# Patient Record
Sex: MALE | Race: WHITE | NOT HISPANIC OR LATINO | Employment: OTHER | URBAN - METROPOLITAN AREA
[De-identification: names, ages, dates, MRNs, and addresses within clinical notes are randomized per-mention and may not be internally consistent; named-entity substitution may affect disease eponyms.]

---

## 2017-02-16 ENCOUNTER — OFFICE VISIT (OUTPATIENT)
Dept: FAMILY MEDICINE | Facility: CLINIC | Age: 52
End: 2017-02-16
Payer: MEDICARE

## 2017-02-16 ENCOUNTER — HOSPITAL ENCOUNTER (OUTPATIENT)
Dept: RADIOLOGY | Facility: HOSPITAL | Age: 52
Discharge: HOME | End: 2017-02-16
Attending: FAMILY MEDICINE
Payer: MEDICARE

## 2017-02-16 VITALS
BODY MASS INDEX: 27.28 KG/M2 | SYSTOLIC BLOOD PRESSURE: 102 MMHG | HEIGHT: 66 IN | DIASTOLIC BLOOD PRESSURE: 72 MMHG | WEIGHT: 169.75 LBS | HEART RATE: 76 BPM

## 2017-02-16 DIAGNOSIS — M25.552 PAIN OF LEFT HIP JOINT: Primary | ICD-10-CM

## 2017-02-16 DIAGNOSIS — G89.29 OTHER CHRONIC PAIN: ICD-10-CM

## 2017-02-16 DIAGNOSIS — M25.552 PAIN OF LEFT HIP JOINT: ICD-10-CM

## 2017-02-16 DIAGNOSIS — M48.02 CERVICAL STENOSIS OF SPINAL CANAL: ICD-10-CM

## 2017-02-16 DIAGNOSIS — F17.200 SMOKER: ICD-10-CM

## 2017-02-16 PROCEDURE — 99214 OFFICE O/P EST MOD 30 MIN: CPT | Mod: S$GLB,,, | Performed by: FAMILY MEDICINE

## 2017-02-16 PROCEDURE — 73502 X-RAY EXAM HIP UNI 2-3 VIEWS: CPT | Mod: TC,PO,LT

## 2017-02-16 PROCEDURE — 99999 PR PBB SHADOW E&M-EST. PATIENT-LVL III: CPT | Mod: PBBFAC,,, | Performed by: FAMILY MEDICINE

## 2017-02-16 PROCEDURE — 73502 X-RAY EXAM HIP UNI 2-3 VIEWS: CPT | Mod: 26,LT,, | Performed by: RADIOLOGY

## 2017-02-16 NOTE — MR AVS SNAPSHOT
[facility]  [address]  [place] [address]  Phone: [phone]  Fax: [phone]                  Wild Galeano   2017 2:40 PM   Office Visit    Description:  Male : 1965   Provider:  Kenny Estrada MD   Department:  [facility]           Reason for Visit     left hip pain           Diagnoses this Visit        Comments    Pain of left hip joint    -  Primary     Other chronic pain                To Do List           Future Appointments        Provider Department Dept Phone    2017 3:45 PM NS XR2 [facility] [phone]    2017 1:30 PM Artur Dennis MD [employer] [phone]      Goals (5 Years of Data)     None      [facility]     [facility] Nurse Care [facility] -  Assistance  Registered nurses in the [facility] provide clinical advisement, health education, appointment booking, and other advisory services.  Call for this free service at [phone].             Medications           Message regarding Medications     Verify the changes and/or additions to your medication regime listed below are the same as discussed with your clinician today.  If any of these changes or additions are incorrect, please notify your healthcare provider.        STOP taking these medications     predniSONE (DELTASONE) 20 MG tablet            Verify that the below list of medications is an accurate representation of the medications you are currently taking.  If none reported, the list may be blank. If incorrect, please contact your healthcare provider. Carry this list with you in case of emergency.           Current Medications     carisoprodol (SOMA) 350 MG tablet Take 1 tablet by mouth 3 (three) times daily as needed.    chlorhexidine (HIBICLENS) 4 % external liquid Apply topically daily as needed. Wash affected areas daily until lesions resolve.    lidocaine-prilocaine (EMLA) cream      "morphine (MS CONTIN) 100 MG 12 hr tablet TAKE ONE TABLET BY MOUTH THREE TIMES DAILY IF NEEDED FOR PAIN    oxycodone (ROXICODONE) 30 MG Tab Take 1 tablet by mouth every 4 (four) hours as needed.           Clinical Reference Information           Your Vitals Were     BP Pulse Height Weight BMI    102/72 76 5' 6" (1.676 m) 77 kg (169 lb 12.1 oz) 27.4 kg/m2      Blood Pressure          Most Recent Value    BP  102/72      Allergies as of 2/16/2017     No Known Allergies      Immunizations Administered on Date of Encounter - 2/16/2017     None      Orders Placed During Today's Visit      Normal Orders This Visit    Ambulatory referral to Orthopedics     Future Labs/Procedures Expected by Expires    X-Ray Hips Bilateral 2 View Incl AP Pelvis  2/16/2017 2/16/2018      Smoking Cessation     If you would like to quit smoking:   You may be eligible for free services if you are a [place] resident and started smoking cigarettes before September 1, 1988.  Call the Smoking Cessation Trust ([facility]) toll free at (998) 524-4945 or (624) 524-7239.   Call 7-823-QUIT-NOW if you do not meet the above criteria.            Language Assistance Services     ATTENTION: Language assistance services are available, free of charge. Please call [phone].      ATENCIÓN: Si habla español, tiene a nelson disposición servicios gratuitos de asistencia lingüística. Llame al [phone].     HENRI Ý: N?u b?n nói Ti?ng Vi?t, có các d?ch v? h? tr? ngôn ng? mi?n phí dành cho b?n. G?i s? [phone].         [facility] complies with applicable Federal civil rights laws and does not discriminate on the basis of race, color, national origin, age, disability, or sex.        "

## 2017-02-20 ENCOUNTER — OFFICE VISIT (OUTPATIENT)
Dept: ORTHOPEDICS | Facility: CLINIC | Age: 52
End: 2017-02-20
Payer: MEDICARE

## 2017-02-20 VITALS — BODY MASS INDEX: 27.16 KG/M2 | HEIGHT: 66 IN | WEIGHT: 169 LBS

## 2017-02-20 DIAGNOSIS — M16.9 HIP ARTHROSIS: Primary | ICD-10-CM

## 2017-02-20 DIAGNOSIS — M48.061 SPINAL STENOSIS OF LUMBAR REGION: ICD-10-CM

## 2017-02-20 DIAGNOSIS — M25.552 HIP PAIN, LEFT: ICD-10-CM

## 2017-02-20 PROCEDURE — 99999 PR PBB SHADOW E&M-EST. PATIENT-LVL II: CPT | Mod: PBBFAC,,, | Performed by: ORTHOPAEDIC SURGERY

## 2017-02-20 PROCEDURE — 99213 OFFICE O/P EST LOW 20 MIN: CPT | Mod: 57,S$GLB,, | Performed by: ORTHOPAEDIC SURGERY

## 2017-02-20 NOTE — PROGRESS NOTES
Wild Galeano is a 51-year-old referred by Dr. Estrada with debilitating   left hip pain that has gotten progressively worse over the past 10 years.  He is   still has a longstanding nonoperative course.  He uses a cane.  He takes oral   medication, has been to therapy, had not significant relief.  He has end-stage   arthritic changes at left hip.    Exam today shows painful motion about the hip.  Skin is intact.  Compartments   are soft.  No signs of infection.  No instability.  Limited motion about the   hip, which is painful.    X-rays show severe arthritic changes of the left hip.    ASSESSMENT:  Left hip arthrosis.    PLAN:  We will schedule him for hip arthroplasty.  The patient is aware of the   risks of surgery and still wants to proceed.  We look forward to seeing him at   the time of surgery.      DOMI/RONEN  dd: 02/20/2017 13:58:24 (CST)  td: 02/20/2017 18:14:12 (CST)  Doc ID   #[id number]  Job ID #[id number]    CC:     Further History  Aching pain  Worse with activity  Relieved with rest  No other associated symptoms  No other radiation    Further Exam  Alert and oriented  Pleasant  Contralateral limb has appropriate range of motion for age and condition  Contralateral limb has appropriate strength for age and condition  Contralateral limb has appropriate stability  for age and condition  No adenopathy  Pulses are appropriate for current condition  Skin is intact        Chief Complaint    Chief Complaint   Patient presents with    Left Hip - Pain       HPI  Wild Galeano is a 51 y.o.  male who presents with       Past Medical History  History reviewed. No pertinent past medical history.    Past Surgical History  History reviewed. No pertinent past surgical history.    Medications  Current Outpatient Prescriptions   Medication Sig    carisoprodol (SOMA) 350 MG tablet Take 1 tablet by mouth 3 (three) times daily as needed.    chlorhexidine (HIBICLENS) 4 % external liquid Apply topically daily as  needed. Wash affected areas daily until lesions resolve.    lidocaine-prilocaine (EMLA) cream     morphine (MS CONTIN) 100 MG 12 hr tablet TAKE ONE TABLET BY MOUTH THREE TIMES DAILY IF NEEDED FOR PAIN    oxycodone (ROXICODONE) 30 MG Tab Take 1 tablet by mouth every 4 (four) hours as needed.     No current facility-administered medications for this visit.        Allergies  Review of patient's allergies indicates:  No Known Allergies    Family History  History reviewed. No pertinent family history.    Social History  Social History     Social History    Marital status:      Spouse name: N/A    Number of children: N/A    Years of education: N/A     Occupational History    Not on file.     Social History Main Topics    Smoking status: Current Every Day Smoker    Smokeless tobacco: Never Used    Alcohol use Not on file    Drug use: Not on file    Sexual activity: Not on file     Other Topics Concern    Not on file     Social History Narrative               Review of Systems     Constitutional: Negative    HENT: Negative  Eyes: Negative  Respiratory: Negative  Cardiovascular: Negative  Musculoskeletal: HPI  Skin: Negative  Neurological: Negative  Hematological: Negative  Endocrine: Negative                 Physical Exam    There were no vitals filed for this visit.  Body mass index is 27.28 kg/(m^2).  Physical Examination:     General appearance -  well appearing, and in no distress  Mental status - awake  Neck - supple  Chest -  symmetric air entry  Heart - normal rate   Abdomen - soft      Assessment     1. Hip pain, left    2. Spinal stenosis of lumbar region    3. Hip arthrosis          Plan

## 2017-02-20 NOTE — LETTER
February 20, 2017      Kenny Estrada MD  [address] [place] [address]           [facility]  [address] [place] [address]  Phone: [phone]          Patient: Wild Galeano   MR Number: [id number]   YOB: 1965   Date of Visit: 2/20/2017       Dear Dr. Kenny Estrada:    Thank you for referring Wild Galeano to me for evaluation. Attached you will find relevant portions of my assessment and plan of care.    If you have questions, please do not hesitate to call me. I look forward to following Wild Galeano along with you.    Sincerely,    Artur Dennis MD    Enclosure  CC:  No Recipients    If you would like to receive this communication electronically, please contact [email or web address] or [phone] to request more information on "[employer] Link access.    For providers and/or their staff who would like to refer a patient to [facility], please contact us through our one-stop-shop provider referral line, [facility], at [phone].    If you feel you have received this communication in error or would no longer like to receive these types of communications, please e-mail [email or web address]

## 2017-02-20 NOTE — PROGRESS NOTES
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  He   has a history of chronic pain already, but now the left hip is beginning to   fail on him.  He has a history of cervical myelopathy with cervical stenosis and   chronic weakness of the lower extremities, chronic pain and smoking.    PHYSICAL EXAMINATION:  VITAL SIGNS:  Normal.  GENERAL:  He walks with a distinct limp.  He has had pain in the left hip.  BACK:  No cervical, thoracic or lumbar spine tenderness today.  EXTREMITIES:  Good pulses in the upper or lower extremities.  No peripheral   edema.  CHEST:  Clear.    ASSESSMENT:  Pain in the left hip, chronic pain, cervical stenosis and smoking.    PLAN:  We addressed each issue, put in a consult to Orthopedics.  We discussed   smoking cessation, pain control.  We reviewed previous x-ray results.      ARCHIE  dd: 02/19/2017 19:41:32 (CST)  td: 02/19/2017 21:03:48 (CST)  Doc ID   #[id number]  Job ID #[id number]    CC:

## 2017-02-21 ENCOUNTER — TELEPHONE (OUTPATIENT)
Dept: FAMILY MEDICINE | Facility: CLINIC | Age: 52
End: 2017-02-21

## 2017-02-21 NOTE — TELEPHONE ENCOUNTER
----- Message from Francoise Jackson LPN sent at 2/21/2017  3:46 PM CST -----  Pt is scheduled for left total hip arthroplasty on 3/7/17 with Dr. Dennis, are you able to clear by chart or fit in for a preop clearance?    Thanks,  Francoise

## 2017-02-21 NOTE — TELEPHONE ENCOUNTER
Dr Estrada/s first appt was 3/7/17 the day surgery is scheduled. I have sent a message to see if we can double book him

## 2017-02-22 DIAGNOSIS — Z96.642 STATUS POST TOTAL REPLACEMENT OF LEFT HIP: Primary | ICD-10-CM

## 2017-02-23 ENCOUNTER — ANTI-COAG VISIT (OUTPATIENT)
Dept: CARDIOLOGY | Facility: CLINIC | Age: 52
End: 2017-02-23

## 2017-02-23 DIAGNOSIS — Z79.01 LONG TERM (CURRENT) USE OF ANTICOAGULANTS: ICD-10-CM

## 2017-02-23 RX ORDER — WARFARIN SODIUM 5 MG/1
5 TABLET ORAL DAILY
Qty: 30 TABLET | Refills: 1 | Status: SHIPPED | OUTPATIENT
Start: 2017-02-23 | End: 2017-03-31 | Stop reason: SDUPTHER

## 2017-02-23 NOTE — TELEPHONE ENCOUNTER
----- Message from Jannette Seo sent at 2/22/2017  4:13 PM CST -----  Contact: self  Patient wants to speak with a nurse regarding scheduling an appointment before 03/22.Please call back at [phone] (home)

## 2017-02-27 ENCOUNTER — OFFICE VISIT (OUTPATIENT)
Dept: FAMILY MEDICINE | Facility: CLINIC | Age: 52
End: 2017-02-27
Payer: MEDICARE

## 2017-02-27 ENCOUNTER — LAB VISIT (OUTPATIENT)
Dept: LAB | Facility: HOSPITAL | Age: 52
End: 2017-02-27
Attending: FAMILY MEDICINE
Payer: MEDICARE

## 2017-02-27 VITALS
HEIGHT: 66 IN | WEIGHT: 166.44 LBS | OXYGEN SATURATION: 98 % | SYSTOLIC BLOOD PRESSURE: 104 MMHG | DIASTOLIC BLOOD PRESSURE: 76 MMHG | BODY MASS INDEX: 26.75 KG/M2 | HEART RATE: 79 BPM

## 2017-02-27 DIAGNOSIS — R79.1 ABNORMAL COAGULATION PROFILE: ICD-10-CM

## 2017-02-27 DIAGNOSIS — F17.200 SMOKER: ICD-10-CM

## 2017-02-27 DIAGNOSIS — M50.90 CERVICAL DISC DISEASE: ICD-10-CM

## 2017-02-27 DIAGNOSIS — Z01.818 PREOPERATIVE CLEARANCE: Primary | ICD-10-CM

## 2017-02-27 DIAGNOSIS — Z01.818 PREOPERATIVE CLEARANCE: ICD-10-CM

## 2017-02-27 DIAGNOSIS — G89.4 CHRONIC PAIN SYNDROME: ICD-10-CM

## 2017-02-27 DIAGNOSIS — M25.552 PAIN OF LEFT HIP JOINT: ICD-10-CM

## 2017-02-27 LAB
ALBUMIN SERPL BCP-MCNC: 3.8 G/DL
ALP SERPL-CCNC: 131 U/L
ALT SERPL W/O P-5'-P-CCNC: 19 U/L
ANION GAP SERPL CALC-SCNC: 8 MMOL/L
AST SERPL-CCNC: 22 U/L
BASOPHILS # BLD AUTO: 0.02 K/UL
BASOPHILS NFR BLD: 0.2 %
BILIRUB SERPL-MCNC: 0.6 MG/DL
BUN SERPL-MCNC: 6 MG/DL
CALCIUM SERPL-MCNC: 9.5 MG/DL
CHLORIDE SERPL-SCNC: 104 MMOL/L
CO2 SERPL-SCNC: 29 MMOL/L
CREAT SERPL-MCNC: 0.8 MG/DL
DIFFERENTIAL METHOD: ABNORMAL
EOSINOPHIL # BLD AUTO: 0.1 K/UL
EOSINOPHIL NFR BLD: 1.2 %
ERYTHROCYTE [DISTWIDTH] IN BLOOD BY AUTOMATED COUNT: 12.8 %
EST. GFR  (AFRICAN AMERICAN): >60 ML/MIN/1.73 M^2
EST. GFR  (NON AFRICAN AMERICAN): >60 ML/MIN/1.73 M^2
GLUCOSE SERPL-MCNC: 159 MG/DL
HCT VFR BLD AUTO: 45.8 %
HGB BLD-MCNC: 15.3 G/DL
INR PPP: 1.1
LYMPHOCYTES # BLD AUTO: 2.3 K/UL
LYMPHOCYTES NFR BLD: 25.7 %
MCH RBC QN AUTO: 31.4 PG
MCHC RBC AUTO-ENTMCNC: 33.4 %
MCV RBC AUTO: 94 FL
MONOCYTES # BLD AUTO: 0.5 K/UL
MONOCYTES NFR BLD: 5.5 %
NEUTROPHILS # BLD AUTO: 6.1 K/UL
NEUTROPHILS NFR BLD: 67.3 %
PLATELET # BLD AUTO: 267 K/UL
PMV BLD AUTO: 10.3 FL
POTASSIUM SERPL-SCNC: 3.9 MMOL/L
PROT SERPL-MCNC: 7.7 G/DL
PROTHROMBIN TIME: 11.1 SEC
RBC # BLD AUTO: 4.88 M/UL
SODIUM SERPL-SCNC: 141 MMOL/L
WBC # BLD AUTO: 9.11 K/UL

## 2017-02-27 PROCEDURE — 99999 PR PBB SHADOW E&M-EST. PATIENT-LVL III: CPT | Mod: PBBFAC,,, | Performed by: FAMILY MEDICINE

## 2017-02-27 PROCEDURE — 99499 UNLISTED E&M SERVICE: CPT | Mod: S$GLB,,, | Performed by: FAMILY MEDICINE

## 2017-02-27 PROCEDURE — 99215 OFFICE O/P EST HI 40 MIN: CPT | Mod: S$GLB,,, | Performed by: FAMILY MEDICINE

## 2017-02-27 PROCEDURE — 1160F RVW MEDS BY RX/DR IN RCRD: CPT | Mod: S$GLB,,, | Performed by: FAMILY MEDICINE

## 2017-02-27 PROCEDURE — 85025 COMPLETE CBC W/AUTO DIFF WBC: CPT

## 2017-02-27 PROCEDURE — 36415 COLL VENOUS BLD VENIPUNCTURE: CPT | Mod: PO

## 2017-02-27 PROCEDURE — 85610 PROTHROMBIN TIME: CPT | Mod: PO

## 2017-02-27 PROCEDURE — 80053 COMPREHEN METABOLIC PANEL: CPT

## 2017-02-27 RX ORDER — CARISOPRODOL 350 MG/1
350 TABLET ORAL 3 TIMES DAILY PRN
Qty: 90 TABLET | Refills: 0 | Status: SHIPPED | OUTPATIENT
Start: 2017-02-27 | End: 2017-05-01

## 2017-02-27 RX ORDER — MORPHINE SULFATE 100 MG/1
TABLET, FILM COATED, EXTENDED RELEASE ORAL
Qty: 90 TABLET | Refills: 0 | Status: SHIPPED | OUTPATIENT
Start: 2017-02-27 | End: 2017-11-28 | Stop reason: DRUGHIGH

## 2017-02-27 RX ORDER — OXYCODONE HYDROCHLORIDE 30 MG/1
30 TABLET ORAL EVERY 4 HOURS PRN
Qty: 60 TABLET | Refills: 0 | Status: SHIPPED | OUTPATIENT
Start: 2017-02-27 | End: 2017-11-28 | Stop reason: ALTCHOICE

## 2017-02-27 NOTE — MR AVS SNAPSHOT
[facility]  [address]  [facility] [address]  Phone: [phone]  Fax: [phone]                  Wild Galeano   2017 1:00 PM   Office Visit    Description:  Male : 1965   Provider:  Kenny Estrada MD   Department:  [facility]           Reason for Visit     Pre-op Exam           Diagnoses this Visit        Comments    Preoperative clearance    -  Primary     Pain of left hip joint         Smoker         Chronic pain syndrome         Abnormal coagulation profile         Status post total replacement of left hip                To Do List           Future Appointments        Provider Department Dept Phone    2017 2:10 PM LAB, [place] [facility] [phone]    3/22/2017 10:45 AM Artur Dennis MD [facility] [phone]      Goals (5 Years of Data)     None       These Medications        Disp Refills Start End    carisoprodol (SOMA) 350 MG tablet 90 tablet 0 2017     Take 1 tablet (350 mg total) by mouth 3 (three) times daily as needed. - Oral    Pharmacy: [facility]/pharmacy #[id number] - [place] - [address] AT [place] Ph #: [phone]       morphine (MS CONTIN) 100 MG 12 hr tablet 90 tablet 0 2017     TAKE ONE TABLET BY MOUTH THREE TIMES DAILY IF NEEDED FOR PAIN    Pharmacy: [facility]/pharmacy #[id number] - [place] - [address] AT [place] Ph #: [phone]       oxycodone (ROXICODONE) 30 MG Tab 60 tablet 0 2017     Take 1 tablet (30 mg total) by mouth every 4 (four) hours as needed. - Oral    Pharmacy: [facility]/pharmacy #[id number] - [place], [place] - [address] AT [place] Ph #: [phone]         [facility] On Call     [facility] On Call Nurse Care Line -  Assistance  Registered nurses in the [facility] provide clinical advisement, health education, appointment booking, and other advisory services.  Call for this free service at [phone].              Medications           Message regarding Medications     Verify the changes and/or additions to your medication regime listed below are the same as discussed with your clinician today.  If any of these changes or additions are incorrect, please notify your healthcare provider.        CHANGE how you are taking these medications     Start Taking Instead of    carisoprodol (SOMA) 350 MG tablet carisoprodol (SOMA) 350 MG tablet    Dosage:  Take 1 tablet (350 mg total) by mouth 3 (three) times daily as needed. Dosage:  Take 1 tablet by mouth 3 (three) times daily as needed.    Reason for Change:  Reorder     oxycodone (ROXICODONE) 30 MG Tab oxycodone (ROXICODONE) 30 MG Tab    Dosage:  Take 1 tablet (30 mg total) by mouth every 4 (four) hours as needed. Dosage:  Take 1 tablet by mouth every 4 (four) hours as needed.    Reason for Change:  Reorder       STOP taking these medications     lidocaine-prilocaine (EMLA) cream            Verify that the below list of medications is an accurate representation of the medications you are currently taking.  If none reported, the list may be blank. If incorrect, please contact your healthcare provider. Carry this list with you in case of emergency.           Current Medications     carisoprodol (SOMA) 350 MG tablet Take 1 tablet (350 mg total) by mouth 3 (three) times daily as needed.    chlorhexidine (HIBICLENS) 4 % external liquid Apply topically daily as needed. Wash affected areas daily until lesions resolve.    morphine (MS CONTIN) 100 MG 12 hr tablet TAKE ONE TABLET BY MOUTH THREE TIMES DAILY IF NEEDED FOR PAIN    oxycodone (ROXICODONE) 30 MG Tab Take 1 tablet (30 mg total) by mouth every 4 (four) hours as needed.    warfarin (COUMADIN) 5 MG tablet Take 1 tablet (5 mg total) by mouth Daily. Start night before surgery 3/6/17           Clinical Reference Information           Your Vitals Were     BP                   104/76           Blood Pressure          Most Recent Value     BP  104/76      Allergies as of 2/27/2017     No Known Allergies      Immunizations Administered on Date of Encounter - 2/27/2017     None      Orders Placed During Today's Visit      Normal Orders This Visit    IN OFFICE EKG 12-LEAD (to [place])     Future Labs/Procedures Expected by Expires    CBC auto differential  2/27/2017 5/28/2017    Protime-INR  2/27/2017 5/28/2017    Comprehensive metabolic panel  8/26/2017 4/28/2018      Smoking Cessation     If you would like to quit smoking:   You may be eligible for free services if you are a [place] resident and started smoking cigarettes before September 1, 1988.  Call the Smoking Cessation Trust ([employer]) toll free at ([phone] [phone] or (719) 629-3823.   Call 0-810-QUIT-NOW if you do not meet the above criteria.            Language Assistance Services     ATTENTION: Language assistance services are available, free of charge. Please call [phone].      ATENCIÓN: Si habla español, tiene a nelson disposición servicios gratuitos de asistencia lingüística. Llame al [phone].     CHÚ Ý: N?u b?n nói Ti?ng Vi?t, có các d?ch v? h? tr? ngôn ng? mi?n phí dành cho b?n. G?i s? [phone].         [facility] complies with applicable Federal civil rights laws and does not discriminate on the basis of race, color, national origin, age, disability, or sex.

## 2017-03-05 ENCOUNTER — TELEPHONE (OUTPATIENT)
Dept: FAMILY MEDICINE | Facility: CLINIC | Age: 52
End: 2017-03-05

## 2017-03-05 NOTE — PROGRESS NOTES
A pleasant male here today.  He is going to have left knee surgery through Dr. Dennis's office.  He is here for preop clearance.  He is a smoker.  He does   have chronic pain.  He is an ex- vet.  He also has cervical disk disease   with myelopathy.  No recent chest pain, nausea or vomiting.  No diarrhea.  No   acute bowel or bladder changes recently.  No recent injuries  PHYSICAL EXAMINATION:  VITAL SIGNS:  Normal.  GENERAL:  He walks with a single-legged cane.  He is in pain.  He is not   homicidal or suicidal.  MUSCULOSKELETAL:  The hip has pain with range of motion.  EXTREMITIES:  He does have myelopathy and myopathy in the lower extremities   bilaterally.    ASSESSMENT:  Preop clearance, pain left hip, smoking, chronic pain and cervical   disk disease.    PLAN:  We will order CBC, CMP, EKG, PT and INR.  I do not see any problems for   clearing him for surgery.      FRANK/TN  dd: 03/05/2017 07:54:49 (CST)  td: 03/05/2017 12:18:35 (CST)  Doc ID   #[id number]  Job ID #[id number]    CC:

## 2017-03-06 ENCOUNTER — OFFICE VISIT (OUTPATIENT)
Dept: ORTHOPEDICS | Facility: CLINIC | Age: 52
End: 2017-03-06
Payer: MEDICARE

## 2017-03-06 ENCOUNTER — HOSPITAL ENCOUNTER (OUTPATIENT)
Dept: RADIOLOGY | Facility: HOSPITAL | Age: 52
Discharge: HOME | End: 2017-03-06
Attending: ORTHOPAEDIC SURGERY
Payer: MEDICARE

## 2017-03-06 VITALS — BODY MASS INDEX: 26.68 KG/M2 | HEIGHT: 66 IN | WEIGHT: 166 LBS

## 2017-03-06 DIAGNOSIS — M25.552 HIP PAIN, LEFT: Primary | ICD-10-CM

## 2017-03-06 DIAGNOSIS — M25.552 PAIN OF LEFT HIP JOINT: ICD-10-CM

## 2017-03-06 DIAGNOSIS — F19.20 DEPENDENCY ON PAIN MEDICATION: ICD-10-CM

## 2017-03-06 DIAGNOSIS — M25.552 PAIN OF LEFT HIP JOINT: Primary | ICD-10-CM

## 2017-03-06 DIAGNOSIS — M25.562 LEFT KNEE PAIN, UNSPECIFIED CHRONICITY: ICD-10-CM

## 2017-03-06 PROCEDURE — 73502 X-RAY EXAM HIP UNI 2-3 VIEWS: CPT | Mod: 26,LT,, | Performed by: RADIOLOGY

## 2017-03-06 PROCEDURE — 99999 PR PBB SHADOW E&M-EST. PATIENT-LVL II: CPT | Mod: PBBFAC,,, | Performed by: ORTHOPAEDIC SURGERY

## 2017-03-06 PROCEDURE — 99213 OFFICE O/P EST LOW 20 MIN: CPT | Mod: S$GLB,,, | Performed by: ORTHOPAEDIC SURGERY

## 2017-03-06 PROCEDURE — 73562 X-RAY EXAM OF KNEE 3: CPT | Mod: 26,LT,, | Performed by: RADIOLOGY

## 2017-03-06 PROCEDURE — 73560 X-RAY EXAM OF KNEE 1 OR 2: CPT | Mod: 26,59,RT, | Performed by: RADIOLOGY

## 2017-03-06 PROCEDURE — 1160F RVW MEDS BY RX/DR IN RCRD: CPT | Mod: S$GLB,,, | Performed by: ORTHOPAEDIC SURGERY

## 2017-03-06 RX ORDER — MUPIROCIN 20 MG/G
OINTMENT TOPICAL
Refills: 0 | COMMUNITY
Start: 2017-03-01 | End: 2017-05-01

## 2017-03-06 NOTE — PROGRESS NOTES
Wild Galeano, 51 years old, want to have his hips checked.  He had a fall   yesterday.  Want to have it checked before his hip replacement surgery, which is   scheduled for tomorrow.  X-rays are negative.    At this point, we will get him scheduled for his hip replacement surgery.  I   look forward to see him at that time.      DOMI/RONEN  dd: 03/06/2017 15:55:21 (CST)  td: 03/07/2017 01:51:44 (CST)  Doc ID   #[id number]  Job ID #[id number]    CC:

## 2017-03-10 ENCOUNTER — TELEPHONE (OUTPATIENT)
Dept: ADMINISTRATIVE | Facility: CLINIC | Age: 52
End: 2017-03-10

## 2017-03-15 NOTE — PROGRESS NOTES
Pt educated on importance of consistency when eating foods high in vitamin K and when to call the [facility]mad[facility] Clinic.Pt given clinic phone number to call with any changes/questions. Pt verbalized understanding.

## 2017-03-16 ENCOUNTER — TELEPHONE (OUTPATIENT)
Dept: ADMINISTRATIVE | Facility: CLINIC | Age: 52
End: 2017-03-16

## 2017-03-16 DIAGNOSIS — N30.00 ACUTE CYSTITIS WITHOUT HEMATURIA: Primary | ICD-10-CM

## 2017-03-17 NOTE — TELEPHONE ENCOUNTER
Dr Peña  [facility] is requesting an order for u/a cands. Pt has low grade fever and foul order to urine. Please advise if we may have order. Dr Estrada has left for vacation.

## 2017-03-20 ENCOUNTER — LAB VISIT (OUTPATIENT)
Dept: LAB | Facility: HOSPITAL | Age: 52
End: 2017-03-20
Attending: ORTHOPAEDIC SURGERY
Payer: MEDICARE

## 2017-03-20 ENCOUNTER — ANTI-COAG VISIT (OUTPATIENT)
Dept: CARDIOLOGY | Facility: CLINIC | Age: 52
End: 2017-03-20

## 2017-03-20 ENCOUNTER — TELEPHONE (OUTPATIENT)
Dept: ORTHOPEDICS | Facility: CLINIC | Age: 52
End: 2017-03-20

## 2017-03-20 DIAGNOSIS — Z79.01 LONG TERM (CURRENT) USE OF ANTICOAGULANTS: ICD-10-CM

## 2017-03-20 DIAGNOSIS — Z47.1 AFTERCARE FOLLOWING JOINT REPLACEMENT: Primary | ICD-10-CM

## 2017-03-20 LAB
INR PPP: 1.8
PROTHROMBIN TIME: 18.4 SEC

## 2017-03-20 PROCEDURE — 85610 PROTHROMBIN TIME: CPT | Mod: PO

## 2017-03-20 NOTE — TELEPHONE ENCOUNTER
----- Message from Marcia Leiva sent at 3/20/2017  3:27 PM CDT -----  Contact: wife kellen   Needs to reschedule back to 03 22 2017  Call back  [phone]

## 2017-03-20 NOTE — PROGRESS NOTES
Spoke with pt gave dosing and next inr check date pt voiced understanding. Pt wants to be seen in clinic instead of h[facility] coming out this time.

## 2017-03-22 ENCOUNTER — OFFICE VISIT (OUTPATIENT)
Dept: ORTHOPEDICS | Facility: CLINIC | Age: 52
End: 2017-03-22
Payer: MEDICARE

## 2017-03-22 VITALS — HEIGHT: 66 IN | BODY MASS INDEX: 26.68 KG/M2 | WEIGHT: 166 LBS

## 2017-03-22 DIAGNOSIS — Z96.642 STATUS POST TOTAL REPLACEMENT OF LEFT HIP: Primary | ICD-10-CM

## 2017-03-22 PROCEDURE — 99999 PR PBB SHADOW E&M-EST. PATIENT-LVL II: CPT | Mod: PBBFAC,,, | Performed by: ORTHOPAEDIC SURGERY

## 2017-03-22 PROCEDURE — 99024 POSTOP FOLLOW-UP VISIT: CPT | Mod: S$GLB,,, | Performed by: ORTHOPAEDIC SURGERY

## 2017-03-22 RX ORDER — BACLOFEN 10 MG/1
TABLET ORAL
COMMUNITY
Start: 2017-03-14 | End: 2017-11-27

## 2017-03-28 NOTE — PROGRESS NOTES
Spoke with pt, pt states he does not  Remember stating he wanted to be seen in clinic rather than h[facility] he states  he now wants h[facility] to come out and have pt inr level done spoke PERRI Case at [place] she voiced understanding order sent to [facility]

## 2017-03-29 ENCOUNTER — LAB VISIT (OUTPATIENT)
Dept: LAB | Facility: HOSPITAL | Age: 52
End: 2017-03-29
Attending: ORTHOPAEDIC SURGERY
Payer: MEDICARE

## 2017-03-29 DIAGNOSIS — Z47.1 AFTERCARE FOLLOWING JOINT REPLACEMENT: Primary | ICD-10-CM

## 2017-03-29 LAB
INR PPP: 2.4
PROTHROMBIN TIME: 23.8 SEC

## 2017-03-29 PROCEDURE — 85610 PROTHROMBIN TIME: CPT | Mod: PO

## 2017-03-30 ENCOUNTER — ANTI-COAG VISIT (OUTPATIENT)
Dept: CARDIOLOGY | Facility: CLINIC | Age: 52
End: 2017-03-30

## 2017-03-30 DIAGNOSIS — Z96.642 STATUS POST TOTAL REPLACEMENT OF LEFT HIP: ICD-10-CM

## 2017-03-30 DIAGNOSIS — Z79.01 LONG TERM (CURRENT) USE OF ANTICOAGULANTS: ICD-10-CM

## 2017-03-30 NOTE — PROGRESS NOTES
lvm C dosing pt was asked to return call to clinic to voice understanding. pts h[facility] was d/c'd pt needs appt

## 2017-03-31 RX ORDER — WARFARIN SODIUM 5 MG/1
5-7.5 TABLET ORAL DAILY
Qty: 35 TABLET | Refills: 3 | Status: SHIPPED | OUTPATIENT
Start: 2017-03-31 | End: 2017-04-19

## 2017-03-31 NOTE — PROGRESS NOTES
Spoke with pt gave dosing and next inr check date pt voiced understanding. Pt states he needs a refill on coumadin snt to [facility] in [place] on [address]

## 2017-04-06 ENCOUNTER — ANTI-COAG VISIT (OUTPATIENT)
Dept: CARDIOLOGY | Facility: CLINIC | Age: 52
End: 2017-04-06
Payer: MEDICARE

## 2017-04-06 DIAGNOSIS — Z79.01 LONG TERM (CURRENT) USE OF ANTICOAGULANTS: ICD-10-CM

## 2017-04-06 LAB — INR PPP: 2.8 (ref 2–3)

## 2017-04-06 PROCEDURE — 99211 OFF/OP EST MAY X REQ PHY/QHP: CPT | Mod: 25,S$GLB,, | Performed by: PHARMACIST

## 2017-04-06 PROCEDURE — 85610 PROTHROMBIN TIME: CPT | Mod: QW,S$GLB,, | Performed by: PHARMACIST

## 2017-04-06 NOTE — MR AVS SNAPSHOT
[facility]  [address]  [place] [address]  Phone: [phone]                  Wild CARSON Puneetalejandrajaime   2017 9:30 AM   Anti-coag visit    Description:  Male : 1965   Provider:  Fay Tan, Georgiana   Department:  [facility]           Diagnoses this Visit        Comments    Long term (current) use of anticoagulants                To Do List           Future Appointments        Provider Department Dept Phone    2017 2:15 PM Kalyani Conway PharmD [facility] [phone]    2017 11:15 AM Artur Dennis MD [facility] [phone]      Goals (5 Years of Data)     None      [facility]     [facility] Care [facility] -  Assistance  Unless otherwise directed by your provider, please contact [facility], our nurse care line that is available for  assistance.     Registered nurses in the [facility] provide: appointment scheduling, clinical advisement, health education, and other advisory services.  Call: [phone] (toll free)               Medications           Message regarding Medications     Verify the changes and/or additions to your medication regime listed below are the same as discussed with your clinician today.  If any of these changes or additions are incorrect, please notify your healthcare provider.             Verify that the below list of medications is an accurate representation of the medications you are currently taking.  If none reported, the list may be blank. If incorrect, please contact your healthcare provider. Carry this list with you in case of emergency.           Current Medications     baclofen (LIORESAL) 10 MG tablet     carisoprodol (SOMA) 350 MG tablet Take 1 tablet (350 mg total) by mouth 3 (three) times daily as needed.    chlorhexidine (HIBICLENS) 4 % external liquid Apply topically daily as needed. Wash affected areas daily until lesions resolve.    morphine (MS CONTIN)  100 MG 12 hr tablet TAKE ONE TABLET BY MOUTH THREE TIMES DAILY IF NEEDED FOR PAIN    mupirocin (BACTROBAN) 2 % ointment USE INTRANASALLY TWICE DAILY FOR 7 DAYS AS DIRECTED    oxycodone (ROXICODONE) 30 MG Tab Take 1 tablet (30 mg total) by mouth every 4 (four) hours as needed.    warfarin (COUMADIN) 5 MG tablet Take 1-1.5 tablets (5-7.5 mg total) by mouth Daily. Start night before surgery 3/6/17           Clinical Reference Information           Allergies as of 4/6/2017     No Known Allergies      Immunizations Administered on Date of Encounter - 4/6/2017     None      Orders Placed During Today's Visit      Normal Orders This Visit    POCT INR          4/6/2017  9:49 AM - Fay Tan, PharmD      Component Results     Component    INR    2.8      March 2017 Details    Sun Mon Tue Wed Thu Fri Sat        1               2               3               4                 5               6               7      5 mg         8      5 mg         9      5 mg         10      5 mg         11      5 mg           12      5 mg         13      5 mg         14      5 mg         15      5 mg         16      5 mg         17      5 mg         18      5 mg           19      5 mg         20   1.8   7.5 mg   See details      21      5 mg         22      5 mg         23      5 mg         24      5 mg         25      5 mg           26      5 mg         27      7.5 mg         28      5 mg         29   2.4   5 mg   See details      30      5 mg   See details      31      5 mg           Date Details   03/20 INR: 1.8   Coumadin Therapy: 2.0 - 3.0 for INR for all indicators except mechanical heart valves and antiphospholipid syndromes which should use 2.5 - 3.5.       03/29 INR: 2.4   Coumadin Therapy: 2.0 - 3.0 for INR for all indicators except mechanical heart valves and antiphospholipid syndromes which should use 2.5 - 3.5.       03/30 Last INR check                 April 2017 Details    Sun Mon Tue Wed Thu Fri Sat           1      5 mg            2      5 mg         3      7.5 mg         4      5 mg         5      5 mg         6   2.8   5 mg   See details      7      5 mg         8      5 mg           9      5 mg         10      5 mg         11      5 mg         12      5 mg         13      5 mg         14      5 mg         15      5 mg           16      5 mg         17      5 mg         18            19               20               21               22                 23               24               25               26               27               28               29                 30                      Date Details   04/06 This INR check   INR: 2.8       Date of next INR:  4/18/2017               How to take your warfarin dose     To take:  5 mg Take 1 of the 5 mg tablets.           Anticoagulation Summary as of 4/6/2017     Maintenance plan 5 mg (5 mg x 1) every day    Full instructions 5 mg every day    Next INR check 4/18/2017      Anticoagulation Episode Summary     Comments [facility] MAYI 3/7 **Farrukh XIONG D/C'd as of 03/30/17**      Smoking Cessation     If you would like to quit smoking:   You may be eligible for free services if you are a [place] resident and started smoking cigarettes before September 1, 1988.  Call the Smoking Cessation Trust ([facility]) toll free at [phone] or [phone].   Call 1-800-QUIT-NOW if you do not meet the above criteria.   Contact us via email: [email or web address]   View our website for more information: [email or web address]        Language Assistance Services     ATTENTION: Language assistance services are available, free of charge. Please call [phone].      ATENCIÓN: Si habla español, tiene a nelson disposición servicios gratuitos de asistencia lingüística. Llame al [phone].     CHÚ Ý: N?u b?n nói Ti?ng Vi?t, có các d?ch v? h? tr? ngôn ng? mi?n phí dành cho b?n. G?i s? [phone].         [facility] complies with applicable Federal civil rights laws and does not  discriminate on the basis of race, color, national origin, age, disability, or sex.

## 2017-04-06 NOTE — PROGRESS NOTES
INR in range, seems to be trending upward.  Pt had anxiety about getting fingerstick, in which he pulled away, cursed and jumped out of his chair and started pacing in the room.  Once he did offer hand to stick and the stick was performed, he said it was not as bad as he thought and did not hurt.  Pt has a spinal cord injury and has neuropathy in his hands.  He feels that in the future he would not respond in this manner.  I am lowering dose to avoid a continual increase, recheck in 10 days; this in hopers of not having to perform any further INR's for this pt.  D/c date is 4/25

## 2017-04-18 ENCOUNTER — ANTI-COAG VISIT (OUTPATIENT)
Dept: CARDIOLOGY | Facility: CLINIC | Age: 52
End: 2017-04-18
Payer: MEDICARE

## 2017-04-18 DIAGNOSIS — Z79.01 LONG TERM (CURRENT) USE OF ANTICOAGULANTS: ICD-10-CM

## 2017-04-18 DIAGNOSIS — Z96.642 STATUS POST TOTAL REPLACEMENT OF LEFT HIP: Primary | ICD-10-CM

## 2017-04-18 LAB — INR PPP: 1.2 (ref 2–3)

## 2017-04-18 PROCEDURE — 99211 OFF/OP EST MAY X REQ PHY/QHP: CPT | Mod: 25,S$GLB,,

## 2017-04-18 PROCEDURE — 85610 PROTHROMBIN TIME: CPT | Mod: QW,S$GLB,,

## 2017-04-18 NOTE — PROGRESS NOTES
Patient confirms dose and compliance. Denies any changes to account for low INR. He is due to discontinue warfarin today. Will stop warfarin therapy at this time.

## 2017-04-19 ENCOUNTER — HOSPITAL ENCOUNTER (OUTPATIENT)
Dept: RADIOLOGY | Facility: HOSPITAL | Age: 52
Discharge: HOME | End: 2017-04-19
Attending: ORTHOPAEDIC SURGERY
Payer: MEDICARE

## 2017-04-19 ENCOUNTER — OFFICE VISIT (OUTPATIENT)
Dept: ORTHOPEDICS | Facility: CLINIC | Age: 52
End: 2017-04-19
Payer: MEDICARE

## 2017-04-19 VITALS — BODY MASS INDEX: 26.68 KG/M2 | HEIGHT: 66 IN | WEIGHT: 166 LBS

## 2017-04-19 DIAGNOSIS — Z96.642 STATUS POST TOTAL REPLACEMENT OF LEFT HIP: ICD-10-CM

## 2017-04-19 DIAGNOSIS — Z96.642 STATUS POST TOTAL REPLACEMENT OF LEFT HIP: Primary | ICD-10-CM

## 2017-04-19 PROCEDURE — 73502 X-RAY EXAM HIP UNI 2-3 VIEWS: CPT | Mod: 26,LT,, | Performed by: RADIOLOGY

## 2017-04-19 PROCEDURE — 99024 POSTOP FOLLOW-UP VISIT: CPT | Mod: S$GLB,,, | Performed by: ORTHOPAEDIC SURGERY

## 2017-04-19 PROCEDURE — 99999 PR PBB SHADOW E&M-EST. PATIENT-LVL II: CPT | Mod: PBBFAC,,, | Performed by: ORTHOPAEDIC SURGERY

## 2017-05-01 ENCOUNTER — OFFICE VISIT (OUTPATIENT)
Dept: FAMILY MEDICINE | Facility: CLINIC | Age: 52
End: 2017-05-01
Payer: MEDICARE

## 2017-05-01 VITALS
BODY MASS INDEX: 26.79 KG/M2 | SYSTOLIC BLOOD PRESSURE: 119 MMHG | TEMPERATURE: 98 F | WEIGHT: 166.69 LBS | HEIGHT: 66 IN | DIASTOLIC BLOOD PRESSURE: 84 MMHG | HEART RATE: 97 BPM

## 2017-05-01 DIAGNOSIS — L98.9 SCALP LESION: ICD-10-CM

## 2017-05-01 DIAGNOSIS — B35.6 JOCK ITCH: ICD-10-CM

## 2017-05-01 DIAGNOSIS — R73.9 HYPERGLYCEMIA: ICD-10-CM

## 2017-05-01 DIAGNOSIS — B35.6 TINEA CRURIS: Primary | ICD-10-CM

## 2017-05-01 PROCEDURE — 1160F RVW MEDS BY RX/DR IN RCRD: CPT | Mod: S$GLB,,, | Performed by: NURSE PRACTITIONER

## 2017-05-01 PROCEDURE — 99213 OFFICE O/P EST LOW 20 MIN: CPT | Mod: S$GLB,,, | Performed by: NURSE PRACTITIONER

## 2017-05-01 PROCEDURE — 99999 PR PBB SHADOW E&M-EST. PATIENT-LVL III: CPT | Mod: PBBFAC,,, | Performed by: NURSE PRACTITIONER

## 2017-05-01 RX ORDER — MUPIROCIN 20 MG/G
OINTMENT TOPICAL 3 TIMES DAILY
Qty: 1 TUBE | Refills: 1 | Status: SHIPPED | OUTPATIENT
Start: 2017-05-01 | End: 2017-11-27

## 2017-05-01 RX ORDER — FLUCONAZOLE 100 MG/1
100 TABLET ORAL DAILY
Qty: 5 TABLET | Refills: 0 | Status: SHIPPED | OUTPATIENT
Start: 2017-05-01 | End: 2017-05-06

## 2017-05-01 RX ORDER — KETOCONAZOLE 20 MG/G
CREAM TOPICAL DAILY
Qty: 1 TUBE | Refills: 0 | Status: SHIPPED | OUTPATIENT
Start: 2017-05-01 | End: 2017-11-27

## 2017-05-01 NOTE — PATIENT INSTRUCTIONS
Bactroban (mupirocin): to scalp    Ketoconazole: to groin          Understanding Carbohydrates, Fats, and Protein  Food is a source of fuel and nourishment for your body. Its also a source of pleasure. Having diabetes doesnt mean you have to eat special foods or give up desserts. Instead, your dietitian can show you how to plan meals to suit your body. To start, learn how different foods affect blood sugar.  Carbohydrates  Carbohydrates are the main source of fuel for the body. Carbohydrates raise blood sugar. Many people think carbohydrates are only found in pasta or bread. But carbohydrates are actually in many kinds of foods:  · Sugars occur naturally in foods such as fruit, milk, honey, and molasses. Sugars can also be added to many foods, from cereals and yogurt to candy and desserts. Sugars raise blood sugar.  · Starches are found in bread, cereals, pasta, and dried beans. Theyre also found in corn, peas, potatoes, yam, acorn squash, and butternut squash. Starches also raise blood sugar.   · Fiber is found in foods such as vegetables, fruits, beans, and whole grains. Unlike other carbs, fiber isnt digested or absorbed. So it doesnt raise blood sugar. In fact, fiber can help keep blood sugar from rising too fast. It also helps keep blood cholesterol at a healthy level.  Did you know?  Even though carbohydrates raise blood sugar, its best to have some in every meal. They are an important part of a healthy diet.   Fat  Fat is an energy source that can be stored until needed. Fat does not raise blood sugar. However, it can raise blood cholesterol, increasing the risk of heart disease. Fat is also high in calories, which can cause weight gain. Not all types of fat are the same.  More Healthy:  · Monounsaturated fats are mostly found in vegetable oils, such as olive, canola, and peanut oils. They are also found in avocados and some nuts. Monounsaturated fats are healthy for your heart. Thats because they  lower LDL (unhealthy) cholesterol.  · Polyunsaturated fats are mostly found in vegetable oils, such as corn, safflower, and soybean oils. They are also found in some seeds, nuts, and fish. Polyunsaturated fats lower LDL (unhealthy) cholesterol. So, choosing them instead of saturated fats is healthy for your heart. Certain unsaturated fats can help lower triglycerides.   Less Healthy:  · Saturated fats are found in animal products, such as meat, poultry, whole milk, lard, and butter. Saturated fats raise LDL cholesterol and are not healthy for your heart.  · Hydrogenated oils and trans fats are formed when vegetable oils are processed into solid fats. They are found in many processed foods. Hydrogenated oils and trans fats raise LDL cholesterol and lower HDL (healthy) cholesterol. They are not healthy for your heart.  Protein  Protein helps the body build and repair muscle and other tissue. Protein has little or no effect on blood sugar. However, many foods that contain protein also contain saturated fat. By choosing low-fat protein sources, you can get the benefits of protein without the extra fat:  · Plant protein is found in dry beans and peas, nuts, and soy products, such as tofu and soymilk. These sources tend to be cholesterol-free and low in saturated fat.  · Animal protein is found in fish, poultry, meat, cheese, milk, and eggs. These contain cholesterol and can be high in saturated fat. Aim for lean, lower-fat choices.  Date Last Reviewed: 3/1/2016  © 7945-7104 [employer]. [address] [place] [address]. All rights reserved. This information is not intended as a substitute for professional medical care. Always follow your healthcare professional's instructions.            Jock Itch (Tinea Cruris, General)  Jock itch (tinea cruris) is a red, itchy rash in the groin caused by a fungal infection. It occurs in skin folds where it is warm and moist. It commonly starts as a small, red,  itchy patch that grows larger. The patch is usually in the shape of a round ring, 1 to 2 inches wide. It may cause the skin to flake. It may also spread to the scrotum or the skin that covers your testicles. This infection is treated with skin creams or oral medicine.  Home care  · If you were prescribed a cream, use it exactly as directed. You can buy some antifungal creams without a prescription.  · It may take a week before the fungus starts to go away. It can take about 2 to 3 weeks to completely clear. To stop the rash from coming back, keep using the medicine until the rash is all gone.  · Wash the area at least once a day with soap and water. Pat dry and apply medicine.   · Wear loose-fitting underwear to let your skin breathe. Change your underwear daily.  · Once the rash is gone, keep the area clean and dry to prevent reinfection. If recurrence is a problem, use a medicated antifungal powder daily. This is available over the counter.  Prevention  The following tips may help prevent jock itch:  · Don't share clothes, towels, or sports gear with others unless they have been washed.  · Change your underwear daily.  · Keep skin clean and dry, especially after showering or swimming.  · Lose weight.  · Do not wear tight underwear.  · Treat athlete's foot if it occurs.  Follow-up care  Follow up with your healthcare provider, or as advised. Call your provider if the rash is not starting to improve after 10 days of treatment, or if the rash continues to spread.  When to seek medical advice  Call your healthcare provider right away if any of these occur:  · Increasing pain in the rash area  · Redness that spreads around the rash  · Fluid draining from the rash  · Rash returns soon after treatment  · Fever of 100.4°F (38°C) or higher, or as directed by your provider  Date Last Reviewed: 8/1/2016  © 4509-6859 [employer]. [address] [place] [address], [place] [address]. All rights reserved. This information is  not intended as a substitute for professional medical care. Always follow your healthcare professional's instructions.

## 2017-05-01 NOTE — MR AVS SNAPSHOT
[facility]  [address] [place] [address]  Phone: [phone]  Fax: [phone]                  Wild Galeano   2017 10:30 AM   Office Visit    Description:  Male : 1965   Provider:  Petrona New NP   Department:  [facility]           Reason for Visit     Rash     Hair/Scalp Problem           Diagnoses this Visit        Comments    Tinea cruris    -  Primary     Jock itch         Hyperglycemia         Scalp lesion                To Do List           Goals (5 Years of Data)     None       These Medications        Disp Refills Start End    fluconazole (DIFLUCAN) 100 MG tablet 5 tablet 0 2017    Take 1 tablet (100 mg total) by mouth once daily. - Oral    Pharmacy: [facility] - [address] Ph #: [phone]       ketoconazole (NIZORAL) 2 % cream 1 Tube 0 2017     Apply topically once daily. - Topical (Top)    Pharmacy: [facility] - [address] Ph #: [phone]       mupirocin (BACTROBAN) 2 % ointment 1 Tube 1 2017     Apply topically 3 (three) times daily. - Topical (Top)    Pharmacy: [facility] - [place] - [address] Ph #: [phone]         [facility]     [facility] Care [facility] -  Assistance  Unless otherwise directed by your provider, please contact [facility], our nurse care line that is available for  assistance.     Registered nurses in the [facility] provide: appointment scheduling, clinical advisement, health education, and other advisory services.  Call: [phone] (toll free)               Medications           Message regarding Medications     Verify the changes and/or additions to your medication regime listed below are the same as discussed with your clinician today.  If any of these changes or additions are incorrect, please  "notify your healthcare provider.        START taking these NEW medications        Refills    fluconazole (DIFLUCAN) 100 MG tablet 0    Sig: Take 1 tablet (100 mg total) by mouth once daily.    Class: Normal    Route: Oral    ketoconazole (NIZORAL) 2 % cream 0    Sig: Apply topically once daily.    Class: Normal    Route: Topical (Top)    mupirocin (BACTROBAN) 2 % ointment 1    Sig: Apply topically 3 (three) times daily.    Class: Normal    Route: Topical (Top)      STOP taking these medications     carisoprodol (SOMA) 350 MG tablet Take 1 tablet (350 mg total) by mouth 3 (three) times daily as needed.           Verify that the below list of medications is an accurate representation of the medications you are currently taking.  If none reported, the list may be blank. If incorrect, please contact your healthcare provider. Carry this list with you in case of emergency.           Current Medications     morphine (MS CONTIN) 100 MG 12 hr tablet TAKE ONE TABLET BY MOUTH THREE TIMES DAILY IF NEEDED FOR PAIN    oxycodone (ROXICODONE) 30 MG Tab Take 1 tablet (30 mg total) by mouth every 4 (four) hours as needed.    baclofen (LIORESAL) 10 MG tablet     chlorhexidine (HIBICLENS) 4 % external liquid Apply topically daily as needed. Wash affected areas daily until lesions resolve.    fluconazole (DIFLUCAN) 100 MG tablet Take 1 tablet (100 mg total) by mouth once daily.    ketoconazole (NIZORAL) 2 % cream Apply topically once daily.    mupirocin (BACTROBAN) 2 % ointment Apply topically 3 (three) times daily.           Clinical Reference Information           Your Vitals Were     BP Pulse Temp Height Weight BMI    119/84 97 98.3 °F (36.8 °C) (Oral) 5' 6" (1.676 m) 75.6 kg (166 lb 10.7 oz) 26.9 kg/m2      Blood Pressure          Most Recent Value    BP  119/84      Allergies as of 5/1/2017     No Known Allergies      Immunizations Administered on Date of Encounter - 5/1/2017     None      Orders Placed During Today's Visit     " Future Labs/Procedures Expected by Expires    Hemoglobin A1c  5/1/2017 6/30/2018      Instructions      Bactroban (mupirocin): to scalp    Ketoconazole: to groin          Understanding Carbohydrates, Fats, and Protein  Food is a source of fuel and nourishment for your body. Its also a source of pleasure. Having diabetes doesnt mean you have to eat special foods or give up desserts. Instead, your dietitian can show you how to plan meals to suit your body. To start, learn how different foods affect blood sugar.  Carbohydrates  Carbohydrates are the main source of fuel for the body. Carbohydrates raise blood sugar. Many people think carbohydrates are only found in pasta or bread. But carbohydrates are actually in many kinds of foods:  · Sugars occur naturally in foods such as fruit, milk, honey, and molasses. Sugars can also be added to many foods, from cereals and yogurt to candy and desserts. Sugars raise blood sugar.  · Starches are found in bread, cereals, pasta, and dried beans. Theyre also found in corn, peas, potatoes, yam, acorn squash, and butternut squash. Starches also raise blood sugar.   · Fiber is found in foods such as vegetables, fruits, beans, and whole grains. Unlike other carbs, fiber isnt digested or absorbed. So it doesnt raise blood sugar. In fact, fiber can help keep blood sugar from rising too fast. It also helps keep blood cholesterol at a healthy level.  Did you know?  Even though carbohydrates raise blood sugar, its best to have some in every meal. They are an important part of a healthy diet.   Fat  Fat is an energy source that can be stored until needed. Fat does not raise blood sugar. However, it can raise blood cholesterol, increasing the risk of heart disease. Fat is also high in calories, which can cause weight gain. Not all types of fat are the same.  More Healthy:  · Monounsaturated fats are mostly found in vegetable oils, such as olive, canola, and peanut oils. They are also  found in avocados and some nuts. Monounsaturated fats are healthy for your heart. Thats because they lower LDL (unhealthy) cholesterol.  · Polyunsaturated fats are mostly found in vegetable oils, such as corn, safflower, and soybean oils. They are also found in some seeds, nuts, and fish. Polyunsaturated fats lower LDL (unhealthy) cholesterol. So, choosing them instead of saturated fats is healthy for your heart. Certain unsaturated fats can help lower triglycerides.   Less Healthy:  · Saturated fats are found in animal products, such as meat, poultry, whole milk, lard, and butter. Saturated fats raise LDL cholesterol and are not healthy for your heart.  · Hydrogenated oils and trans fats are formed when vegetable oils are processed into solid fats. They are found in many processed foods. Hydrogenated oils and trans fats raise LDL cholesterol and lower HDL (healthy) cholesterol. They are not healthy for your heart.  Protein  Protein helps the body build and repair muscle and other tissue. Protein has little or no effect on blood sugar. However, many foods that contain protein also contain saturated fat. By choosing low-fat protein sources, you can get the benefits of protein without the extra fat:  · Plant protein is found in dry beans and peas, nuts, and soy products, such as tofu and soymilk. These sources tend to be cholesterol-free and low in saturated fat.  · Animal protein is found in fish, poultry, meat, cheese, milk, and eggs. These contain cholesterol and can be high in saturated fat. Aim for lean, lower-fat choices.  Date Last Reviewed: 3/1/2016  © 0546-4664 [employer]. [address] [place] [address], [place] [address]. All rights reserved. This information is not intended as a substitute for professional medical care. Always follow your healthcare professional's instructions.            Jock Itch (Tinea Cruris, General)  Jock itch (tinea cruris) is a red, itchy rash in the groin caused by a fungal  infection. It occurs in skin folds where it is warm and moist. It commonly starts as a small, red, itchy patch that grows larger. The patch is usually in the shape of a round ring, 1 to 2 inches wide. It may cause the skin to flake. It may also spread to the scrotum or the skin that covers your testicles. This infection is treated with skin creams or oral medicine.  Home care  · If you were prescribed a cream, use it exactly as directed. You can buy some antifungal creams without a prescription.  · It may take a week before the fungus starts to go away. It can take about 2 to 3 weeks to completely clear. To stop the rash from coming back, keep using the medicine until the rash is all gone.  · Wash the area at least once a day with soap and water. Pat dry and apply medicine.   · Wear loose-fitting underwear to let your skin breathe. Change your underwear daily.  · Once the rash is gone, keep the area clean and dry to prevent reinfection. If recurrence is a problem, use a medicated antifungal powder daily. This is available over the counter.  Prevention  The following tips may help prevent jock itch:  · Don't share clothes, towels, or sports gear with others unless they have been washed.  · Change your underwear daily.  · Keep skin clean and dry, especially after showering or swimming.  · Lose weight.  · Do not wear tight underwear.  · Treat athlete's foot if it occurs.  Follow-up care  Follow up with your healthcare provider, or as advised. Call your provider if the rash is not starting to improve after 10 days of treatment, or if the rash continues to spread.  When to seek medical advice  Call your healthcare provider right away if any of these occur:  · Increasing pain in the rash area  · Redness that spreads around the rash  · Fluid draining from the rash  · Rash returns soon after treatment  · Fever of 100.4°F (38°C) or higher, or as directed by your provider  Date Last Reviewed: 8/1/2016  © 9759-5232 [employer]. [address] [place] [address], [place] [address]. All rights reserved. This information is not intended as a substitute for professional medical care. Always follow your healthcare professional's instructions.             Smoking Cessation     If you would like to quit smoking:   You may be eligible for free services if you are a [place] resident and started smoking cigarettes before September 1, 1988.  Call the Smoking Cessation Trust ([facility]) toll free at [phone] or [phone].   Call 1-800-QUIT-NOW if you do not meet the above criteria.   Contact us via email: [email or web address]   View our website for more information: [email or web address]        Language Assistance Services     ATTENTION: Language assistance services are available, free of charge. Please call [phone].      ATENCIÓN: Si barbara gabriel, tiene a nelson disposición servicios gratuitos de asistencia lingüística. Llame al [phone].     CHÚ Ý: N?u b?n nói Ti?ng Vi?t, có các d?ch v? h? tr? ngôn ng? mi?n phí dành cho b?n. G?i s? [phone].         [facility] complies with applicable Federal civil rights laws and does not discriminate on the basis of race, color, national origin, age, disability, or sex.

## 2017-05-01 NOTE — PROGRESS NOTES
Subjective:       Patient ID: Wild Galeano is a 52 y.o. male.    Chief Complaint: Rash (genital area~red itchy, foul odor ) and Hair/Scalp Problem (bumps~small and painful right side of head scalp)    HPI Comments: Patient reports he has been to dermatologist for scalp problems in past and was told it is related to hair follicles--has not resolved     Rash   This is a new problem. The current episode started 1 to 4 weeks ago. The problem has been gradually worsening since onset. The affected locations include the groin. The rash is characterized by redness (moist). He was exposed to nothing. Pertinent negatives include no anorexia, congestion, cough, diarrhea, eye pain, fatigue, fever, shortness of breath, sore throat or vomiting. Treatments tried: zinc oxide cream. The treatment provided no relief.   Hair/Scalp Problem   This is a recurrent problem. The current episode started more than 1 year ago. The problem occurs intermittently. The problem has been waxing and waning. Associated symptoms include a rash. Pertinent negatives include no abdominal pain, anorexia, chest pain, congestion, coughing, fatigue, fever, headaches, neck pain, sore throat, vomiting or weakness. Nothing aggravates the symptoms. He has tried nothing for the symptoms.     Vitals:    05/01/17 1034   BP: 119/84   Pulse: 97   Temp: 98.3 °F (36.8 °C)     Review of Systems   Constitutional: Negative.  Negative for appetite change, fatigue and fever.   HENT: Negative.  Negative for congestion, ear pain, postnasal drip, sinus pressure, sore throat and trouble swallowing.    Eyes: Negative.  Negative for pain and visual disturbance.   Respiratory: Negative.  Negative for cough, chest tightness, shortness of breath and wheezing.    Cardiovascular: Negative.  Negative for chest pain and palpitations.   Gastrointestinal: Negative.  Negative for abdominal distention, abdominal pain, anorexia, diarrhea and vomiting.   Endocrine: Negative.  Negative  for polydipsia, polyphagia and polyuria.   Genitourinary: Negative.  Negative for difficulty urinating, dysuria and flank pain.   Musculoskeletal: Negative.  Negative for back pain and neck pain.   Skin: Positive for rash. Negative for wound.        Rash on groin  Lesions to scalp     Allergic/Immunologic: Negative.  Negative for immunocompromised state.   Neurological: Negative.  Negative for dizziness, syncope, weakness and headaches.   Hematological: Negative.    Psychiatric/Behavioral: Negative.  Negative for agitation, confusion and hallucinations. The patient is not nervous/anxious.        History reviewed. No pertinent past medical history.  Objective:      Physical Exam   Constitutional: He is oriented to person, place, and time. He appears well-developed and well-nourished.  Non-toxic appearance. He does not have a sickly appearance. He does not appear ill. No distress.   HENT:   Head: Normocephalic and atraumatic.   Right Ear: Hearing and external ear normal.   Left Ear: Hearing and external ear normal.   Nose: Nose normal.   Mouth/Throat: Uvula is midline and mucous membranes are normal.   Pimple like lesions around base of scalp and on scalp above ears; different levels of healing--some scarring, some tender to touch, red, inflamed    Eyes: Conjunctivae, EOM and lids are normal. Pupils are equal, round, and reactive to light. Right eye exhibits no discharge. Left eye exhibits no discharge.   Neck: Trachea normal, normal range of motion and full passive range of motion without pain. Neck supple. No JVD present. No tracheal tenderness present. No tracheal deviation present.   Cardiovascular:  No extrasystoles are present. Exam reveals no gallop and no friction rub.    No murmur heard.  Pulmonary/Chest: He exhibits no tenderness.   Abdominal: Soft. Normal appearance and bowel sounds are normal. He exhibits no distension and no mass. There is no hepatosplenomegaly. There is no tenderness. There is no  guarding. No hernia. Hernia confirmed negative in the right inguinal area and confirmed negative in the left inguinal area.   Genitourinary: Penis normal. Right testis shows no mass and no tenderness. Left testis shows no mass and no tenderness. No penile tenderness. No discharge found.   Genitourinary Comments: Slightly raised, red, moist inflamed patches of fungal infection to groin bilaterally   Musculoskeletal: Normal range of motion. He exhibits no edema or deformity.   Lymphadenopathy: No inguinal adenopathy noted on the right or left side.        Right: No inguinal adenopathy present.        Left: No inguinal adenopathy present.   Neurological: He is alert and oriented to person, place, and time. No sensory deficit. He exhibits normal muscle tone. Coordination normal.   Skin: Skin is warm, dry and intact. No lesion and no rash noted. He is not diaphoretic. No erythema.   Psychiatric: He has a normal mood and affect. His speech is normal and behavior is normal. Judgment and thought content normal. Cognition and memory are normal.   Nursing note and vitals reviewed.      Assessment:       1. Tinea cruris    2. Jock itch    3. Hyperglycemia    4. Scalp lesion        Plan:       Tinea cruris  -     fluconazole (DIFLUCAN) 100 MG tablet; Take 1 tablet (100 mg total) by mouth once daily.  Dispense: 5 tablet; Refill: 0  -     ketoconazole (NIZORAL) 2 % cream; Apply topically once daily.  Dispense: 1 Tube; Refill: 0    Jock itch  -     fluconazole (DIFLUCAN) 100 MG tablet; Take 1 tablet (100 mg total) by mouth once daily.  Dispense: 5 tablet; Refill: 0  -     ketoconazole (NIZORAL) 2 % cream; Apply topically once daily.  Dispense: 1 Tube; Refill: 0    Hyperglycemia  -     Hemoglobin A1c; Future; Expected date: 5/1/17  --labs reviewed; Educated on diabetic diet--decrease carb and sugar intake    Scalp lesion  -     mupirocin (BACTROBAN) 2 % ointment; Apply topically 3 (three) times daily.  Dispense: 1 Tube; Refill:  1          Follow up if symptoms worsen or persist; follow up with dermatologist if scalp lesions do not resolve with mupirocin   Will notify if Hemoglobin A1c abnormal

## 2017-05-08 DIAGNOSIS — Z96.642 STATUS POST TOTAL REPLACEMENT OF LEFT HIP: Primary | ICD-10-CM

## 2017-05-09 DIAGNOSIS — Z96.642 STATUS POST TOTAL REPLACEMENT OF LEFT HIP: Primary | ICD-10-CM

## 2017-05-10 ENCOUNTER — OFFICE VISIT (OUTPATIENT)
Dept: ORTHOPEDICS | Facility: CLINIC | Age: 52
End: 2017-05-10
Payer: MEDICARE

## 2017-05-10 ENCOUNTER — HOSPITAL ENCOUNTER (OUTPATIENT)
Dept: RADIOLOGY | Facility: HOSPITAL | Age: 52
Discharge: HOME | End: 2017-05-10
Attending: ORTHOPAEDIC SURGERY
Payer: MEDICARE

## 2017-05-10 VITALS — WEIGHT: 166 LBS | HEIGHT: 66 IN | BODY MASS INDEX: 26.68 KG/M2

## 2017-05-10 DIAGNOSIS — Z96.642 STATUS POST TOTAL REPLACEMENT OF LEFT HIP: Primary | ICD-10-CM

## 2017-05-10 DIAGNOSIS — Z96.642 STATUS POST TOTAL REPLACEMENT OF LEFT HIP: ICD-10-CM

## 2017-05-10 PROCEDURE — 1160F RVW MEDS BY RX/DR IN RCRD: CPT | Mod: S$GLB,,, | Performed by: ORTHOPAEDIC SURGERY

## 2017-05-10 PROCEDURE — 99999 PR PBB SHADOW E&M-EST. PATIENT-LVL II: CPT | Mod: PBBFAC,,, | Performed by: ORTHOPAEDIC SURGERY

## 2017-05-10 PROCEDURE — 73502 X-RAY EXAM HIP UNI 2-3 VIEWS: CPT | Mod: 26,LT,, | Performed by: RADIOLOGY

## 2017-05-10 PROCEDURE — 99024 POSTOP FOLLOW-UP VISIT: CPT | Mod: S$GLB,,, | Performed by: ORTHOPAEDIC SURGERY

## 2017-05-10 NOTE — PROGRESS NOTES
Mr. Galeano about 12 weeks out from hip replacement.  He says he is doing well   until a few days ago, twisted his hips, has been more painful, wanted to have   it checked.  Concerned he might have damaged something.    Exam today checks out well.  No signs of infection.  No instability detected.    Strength is good.    X-rays show well-placed components.    Plan:  Symptomatic care.  Follow up as needed.  Noted that the patient reports   after his hip replacement, he has been able to come off with muscle relaxers and   has decreased the nonnarcotic he has taken slightly.  We discussed with him   continued trying to stop taking opioid medications, but he feels that taking   that actually improves the quality of his life from his other spinal disorders.    We will see him back here in our clinic as needed.      GISEL  dd: 05/10/2017 09:34:22 (CDT)  td: 05/10/2017 18:08:16 (CDT)  Doc ID   #[id number]  Job ID #[id number]    CC:

## 2017-05-11 ENCOUNTER — TELEPHONE (OUTPATIENT)
Dept: FAMILY MEDICINE | Facility: CLINIC | Age: 52
End: 2017-05-11

## 2017-05-11 NOTE — TELEPHONE ENCOUNTER
----- Message from Josias Wilder sent at 5/11/2017 10:27 AM CDT -----  Contact: hosa- [phone]  Patient returning the nurse phone call.Thanks!

## 2017-11-27 ENCOUNTER — OFFICE VISIT (OUTPATIENT)
Dept: FAMILY MEDICINE | Facility: CLINIC | Age: 52
End: 2017-11-27
Payer: MEDICARE

## 2017-11-27 ENCOUNTER — TELEPHONE (OUTPATIENT)
Dept: FAMILY MEDICINE | Facility: CLINIC | Age: 52
End: 2017-11-27

## 2017-11-27 VITALS
RESPIRATION RATE: 18 BRPM | HEIGHT: 66 IN | OXYGEN SATURATION: 97 % | TEMPERATURE: 98 F | WEIGHT: 168.19 LBS | SYSTOLIC BLOOD PRESSURE: 104 MMHG | DIASTOLIC BLOOD PRESSURE: 84 MMHG | BODY MASS INDEX: 27.03 KG/M2 | HEART RATE: 103 BPM

## 2017-11-27 DIAGNOSIS — Z12.5 ENCOUNTER FOR SCREENING FOR MALIGNANT NEOPLASM OF PROSTATE: ICD-10-CM

## 2017-11-27 DIAGNOSIS — Z12.11 ENCOUNTER FOR SCREENING FOR MALIGNANT NEOPLASM OF COLON: ICD-10-CM

## 2017-11-27 DIAGNOSIS — M50.00 CERVICAL DISC DISEASE WITH MYELOPATHY: ICD-10-CM

## 2017-11-27 DIAGNOSIS — M50.30 DDD (DEGENERATIVE DISC DISEASE), CERVICAL: ICD-10-CM

## 2017-11-27 DIAGNOSIS — R73.03 PREDIABETES: ICD-10-CM

## 2017-11-27 DIAGNOSIS — F17.200 SMOKER: ICD-10-CM

## 2017-11-27 DIAGNOSIS — Z13.6 ENCOUNTER FOR SCREENING FOR CARDIOVASCULAR DISORDERS: ICD-10-CM

## 2017-11-27 DIAGNOSIS — F11.23 OPIOID DEPENDENCE WITH WITHDRAWAL: Primary | ICD-10-CM

## 2017-11-27 DIAGNOSIS — M50.00 CERVICAL DISC DISEASE WITH MYELOPATHY: Primary | ICD-10-CM

## 2017-11-27 DIAGNOSIS — M54.2 NECK PAIN: ICD-10-CM

## 2017-11-27 PROBLEM — Z79.01 LONG TERM (CURRENT) USE OF ANTICOAGULANTS: Status: RESOLVED | Noted: 2017-02-23 | Resolved: 2017-11-27

## 2017-11-27 PROCEDURE — 99214 OFFICE O/P EST MOD 30 MIN: CPT | Mod: S$GLB,,, | Performed by: NURSE PRACTITIONER

## 2017-11-27 PROCEDURE — 99999 PR PBB SHADOW E&M-EST. PATIENT-LVL IV: CPT | Mod: PBBFAC,,, | Performed by: NURSE PRACTITIONER

## 2017-11-27 PROCEDURE — 99499 UNLISTED E&M SERVICE: CPT | Mod: S$GLB,,, | Performed by: NURSE PRACTITIONER

## 2017-11-27 NOTE — PROGRESS NOTES
Subjective:       Patient ID: Wild Galeano is a 52 y.o. male.    Chief Complaint: Neck Pain (wants referral to pain management); Nausea; Emesis; and Diarrhea    HPI   Mr. Galeano is a new patient to me, former patient of Dr. Estrada. He presents today for multiple complaints--chronic neck pain due to cervical disc disease with myelopathy, scheduled for surgery next year however he is currently out of pain medication and needs referral to pain management. He reports N/V/D since running out of medications. Negative for tremors, hallucinations, confusion.     Vitals:    11/27/17 0751   BP: 104/84   Pulse: 103   Resp: 18   Temp: 97.6 °F (36.4 °C)     Review of Systems   Constitutional: Negative.  Negative for diaphoresis and fever.   HENT: Negative.  Negative for facial swelling and trouble swallowing.    Eyes: Negative.  Negative for discharge and redness.   Respiratory: Negative.  Negative for cough and shortness of breath.    Cardiovascular: Negative.  Negative for chest pain and palpitations.   Gastrointestinal: Positive for diarrhea, nausea and vomiting.   Genitourinary: Negative.  Negative for difficulty urinating and flank pain.   Musculoskeletal: Positive for neck pain and neck stiffness. Negative for back pain.   Skin: Negative.  Negative for rash and wound.   Neurological: Negative.  Negative for dizziness, tremors and light-headedness.   Hematological: Negative.    Psychiatric/Behavioral: Negative.  Negative for confusion. The patient is not nervous/anxious.        History reviewed. No pertinent past medical history.  Objective:      Physical Exam   Constitutional: He is oriented to person, place, and time. He does not have a sickly appearance. No distress.   HENT:   Head: Normocephalic.   Right Ear: Hearing normal.   Left Ear: Hearing normal.   Nose: Nose normal.   Eyes: Conjunctivae and lids are normal.   Neck: Trachea normal. No JVD present.   Cardiovascular: Regular rhythm, S1 normal, S2 normal  and normal heart sounds.  Tachycardia present.    Pulmonary/Chest: Effort normal and breath sounds normal. He exhibits no tenderness.   Abdominal: Normal appearance. He exhibits no distension.   Musculoskeletal: Normal range of motion. He exhibits no edema or deformity.        Cervical back: He exhibits pain.   Neurological: He is alert and oriented to person, place, and time.   Skin: Skin is warm and dry. He is not diaphoretic. No pallor.   Psychiatric: He has a normal mood and affect. His speech is normal and behavior is normal. Judgment and thought content normal. Cognition and memory are normal.   Nursing note and vitals reviewed.      Assessment:       1. Opioid dependence with withdrawal    2. Cervical disc disease with myelopathy    3. DDD (degenerative disc disease), cervical    4. Neck pain    5. Prediabetes    6. Smoker    7. Encounter for screening for malignant neoplasm of prostate    8. Encounter for screening for cardiovascular disorders     9. Encounter for screening for malignant neoplasm of colon        Plan:       Opioid dependence with withdrawal   Educated on danger of withdrawal, advised to go to ED but patient declines, reports he will go if symptoms worsen or persist    Cervical disc disease with myelopathy  -     Ambulatory referral to Pain Clinic    DDD (degenerative disc disease), cervical    Neck pain  -     Ambulatory referral to Pain Clinic    Prediabetes  -     CBC auto differential; Future; Expected date: 11/27/2017  -     Comprehensive metabolic panel; Future; Expected date: 11/27/2017  -     TSH; Future; Expected date: 11/27/2017    Smoker  -     Lipid panel; Future; Expected date: 11/27/2017  -     TSH; Future; Expected date: 11/27/2017    Encounter for screening for malignant neoplasm of prostate  -     PSA, Screening; Future; Expected date: 11/27/2017    Encounter for screening for cardiovascular disorders   -     Lipid panel; Future; Expected date: 11/27/2017    Encounter for  screening for malignant neoplasm of colon  -     Case request GI: COLONOSCOPY      Follow up to establish care

## 2017-11-27 NOTE — TELEPHONE ENCOUNTER
Dr Alejo,    Please sign referral to pain management for this patient--you are the provider on his insurance card and insurance says referral must come from you.

## 2017-11-28 ENCOUNTER — OFFICE VISIT (OUTPATIENT)
Dept: PAIN MEDICINE | Facility: CLINIC | Age: 52
End: 2017-11-28
Payer: MEDICARE

## 2017-11-28 VITALS
DIASTOLIC BLOOD PRESSURE: 71 MMHG | HEIGHT: 67 IN | SYSTOLIC BLOOD PRESSURE: 112 MMHG | HEART RATE: 118 BPM | RESPIRATION RATE: 22 BRPM | BODY MASS INDEX: 26.26 KG/M2 | WEIGHT: 167.31 LBS

## 2017-11-28 DIAGNOSIS — F11.90 CHRONIC, CONTINUOUS USE OF OPIOIDS: ICD-10-CM

## 2017-11-28 DIAGNOSIS — M48.062 SPINAL STENOSIS OF LUMBAR REGION WITH NEUROGENIC CLAUDICATION: ICD-10-CM

## 2017-11-28 DIAGNOSIS — F11.93 OPIOID WITHDRAWAL: ICD-10-CM

## 2017-11-28 DIAGNOSIS — M50.00 CERVICAL DISC DISEASE WITH MYELOPATHY: Primary | ICD-10-CM

## 2017-11-28 DIAGNOSIS — M79.18 MYOFASCIAL PAIN SYNDROME, CERVICAL: ICD-10-CM

## 2017-11-28 PROCEDURE — 99205 OFFICE O/P NEW HI 60 MIN: CPT | Mod: S$GLB,,, | Performed by: PAIN MEDICINE

## 2017-11-28 PROCEDURE — 80307 DRUG TEST PRSMV CHEM ANLYZR: CPT

## 2017-11-28 PROCEDURE — 99999 PR PBB SHADOW E&M-EST. PATIENT-LVL III: CPT | Mod: PBBFAC,,, | Performed by: PAIN MEDICINE

## 2017-11-28 PROCEDURE — 99499 UNLISTED E&M SERVICE: CPT | Mod: S$GLB,,, | Performed by: PAIN MEDICINE

## 2017-11-28 RX ORDER — OXYCODONE HYDROCHLORIDE 10 MG/1
TABLET ORAL
COMMUNITY
Start: 2017-11-15 | End: 2017-12-12 | Stop reason: SDUPTHER

## 2017-11-28 RX ORDER — PREGABALIN 25 MG/1
25 CAPSULE ORAL 2 TIMES DAILY
Qty: 60 CAPSULE | Refills: 0 | Status: CANCELLED | OUTPATIENT
Start: 2017-11-28 | End: 2018-05-29

## 2017-11-28 RX ORDER — CLONIDINE 0.1 MG/24H
1 PATCH, EXTENDED RELEASE TRANSDERMAL
Qty: 4 PATCH | Refills: 0 | Status: SHIPPED | OUTPATIENT
Start: 2017-11-28 | End: 2018-01-09 | Stop reason: ALTCHOICE

## 2017-11-28 RX ORDER — PREGABALIN 25 MG/1
CAPSULE ORAL
Qty: 60 CAPSULE | Refills: 0 | Status: SHIPPED | OUTPATIENT
Start: 2017-11-28 | End: 2017-11-28

## 2017-11-28 RX ORDER — NORTRIPTYLINE HYDROCHLORIDE 25 MG/1
25 CAPSULE ORAL NIGHTLY
Qty: 30 CAPSULE | Refills: 0 | Status: SHIPPED | OUTPATIENT
Start: 2017-11-28 | End: 2017-12-12 | Stop reason: SDUPTHER

## 2017-11-28 RX ORDER — PREGABALIN 25 MG/1
CAPSULE ORAL
Qty: 60 CAPSULE | Refills: 0 | Status: SHIPPED | OUTPATIENT
Start: 2017-11-28 | End: 2017-12-12 | Stop reason: SDUPTHER

## 2017-11-28 RX ORDER — MORPHINE SULFATE 30 MG/1
30 TABLET, FILM COATED, EXTENDED RELEASE ORAL 3 TIMES DAILY
Qty: 42 TABLET | Refills: 0 | Status: SHIPPED | OUTPATIENT
Start: 2017-11-28 | End: 2017-12-12 | Stop reason: SDUPTHER

## 2017-11-28 NOTE — PROGRESS NOTES
[facility] New Patient Evaluation    Referred by: GRACE New  Reason for referral: neck pain and opioid use    CC:   Chief Complaint   Patient presents with    Neck Pain     arm, and hand numbness     Back Pain       HPI: Wild Galeano is a pleasant 52 y.o. male who presented to my clinic complaining of neck pain and full body shooting pains as characterized below.  He was previously treated by Dr. Osman for pain management who left then he went to Dr. Agrawal.  He was recently evaluated by a physician at [facility] who proposes surgical intervention only on the level that is most stenotic though other surgeons have recommended full c-spine fusion.  His neck pain was previously treated with high-dose opioid therapy (  TID and Oxycodone 30 QID PRN (MEDD = 480)) which was abruptly cut recently when his PCP retired.  He reports diarrhea, nausea, vomiting, hot flashes, and difficulty concentrating for the past 2 weeks.    Location: neck due to severe spinal stenosis  Severity: Currently: 8/10   Typical Range: 6-8/10     Exacerbation: 8-9+/10   Onset: initially in 1992 with a  injury but acutely worsened in 2008  Quality: Tight, Sharp, Burning and stinging needles  Radiation: Bilat UE and LE  Axial/Extremity Percentage of Pain: 70/30  Exacerbating Factors: sitting and standing  Mitigating Factors: pain medications, heat, ice, laying down and hot showers  Assoc: denies night fever/night sweats, urinary incontinence or bowel incontinence BUT endorses numbness and sensory loss in the hands and feet    Previous Therapies:  PT: none recently for neck; completed for hip post MAYI in march 2017  HEP:   TENS: helped very little  Injections:    - multiple MANOLO (3-4x) - didn't help  Surgery:    - none on spine   - s/p LEFT MAYI in March 2017  Medications:    - NSAIDS: Ibuprofen 800, Meloxicam 15 - no relief   - MSK Relaxants: SOMA, Baclofen   - TCAs: denies   - SNRIs: denies   - Topicals:  denies   - Opioids: current   - Anti-convulsants: Gabapentin - tried without relief    Current Pain Medications:  1. Oxycodone 10 mg q4 prn - taking 6 tabs/day    Full Medication List:    Current Outpatient Prescriptions:     chlorhexidine (HIBICLENS) 4 % external liquid, Apply topically daily as needed. Wash affected areas daily until lesions resolve., Disp: , Rfl: 0    oxyCODONE (ROXICODONE) 10 mg Tab immediate release tablet, , Disp: , Rfl:     morphine (MS CONTIN) 100 MG 12 hr tablet, TAKE ONE TABLET BY MOUTH THREE TIMES DAILY IF NEEDED FOR PAIN, Disp: 90 tablet, Rfl: 0     Review of Systems:  Review of Systems   Constitutional: Negative for chills and fever.   HENT: Negative for nosebleeds.    Eyes: Negative for pain.   Respiratory: Negative for hemoptysis.    Cardiovascular: Negative for chest pain.   Gastrointestinal: Positive for diarrhea and vomiting. Negative for nausea.   Musculoskeletal: Positive for back pain and neck pain.   Skin: Negative for rash.   Neurological: Negative for dizziness.   Endo/Heme/Allergies: Does not bruise/bleed easily.   Psychiatric/Behavioral: The patient has insomnia.        Allergies:  Patient has no known allergies.     Medical History:  Past Medical History:   Diagnosis Date    Prediabetes     PTSD (post-traumatic stress disorder)     Smoker         Surgical History:  Past Surgical History:   Procedure Laterality Date    JOINT REPLACEMENT Left 03/2017    MAYI        Social History:  Social History     Social History    Marital status:      Spouse name: N/A    Number of children: N/A    Years of education: N/A     Occupational History    Not on file.     Social History Main Topics    Smoking status: Current Every Day Smoker     Packs/day: 2.00     Years: 36.00     Types: Cigarettes    Smokeless tobacco: Never Used    Alcohol use No    Drug use: No    Sexual activity: No     Other Topics Concern    Not on file     Social History Narrative    No narrative  "on file       Physical Exam:  Vitals:    11/28/17 0953   BP: 112/71   Pulse: (!) 118   Resp: (!) 22   Weight: 75.9 kg (167 lb 5.3 oz)   Height: 5' 7" (1.702 m)   PainSc:   8   PainLoc: Neck     General    Nursing note and vitals reviewed.  Constitutional: He is oriented to person, place, and time. He appears well-developed and well-nourished. No distress.   HENT:   Head: Normocephalic and atraumatic.   Nose: Nose normal.   Eyes: Conjunctivae and EOM are normal. Pupils are equal, round, and reactive to light. Right eye exhibits no discharge. Left eye exhibits no discharge. No scleral icterus.   Neck: No JVD present.   Cardiovascular: Intact distal pulses.    Pulmonary/Chest: Effort normal. No respiratory distress.   Abdominal: He exhibits no distension.   Neurological: He is alert and oriented to person, place, and time. Coordination normal.   Psychiatric: He has a normal mood and affect. His behavior is normal. Judgment and thought content normal.     General Musculoskeletal Exam   Gait: abnormal and antalgic     Back (L-Spine & T-Spine) / Neck (C-Spine) Exam     Tenderness Right paramedian tenderness of the Upper C-Spine, Lower C-Spine and Occ. Left paramedian tenderness of the Upper C-Spine, Lower C-Spine and Occ.     Neck (C-Spine) Range of Motion   Flexion:     Severely limited  Extension: Severely limited  Right Lateral Bend: normal  Left Lateral Bend: normal  Right Rotation: normal  Left Rotation: normal    Neck (C-Spine) Tests   Spurling's Test   Left:  positive  Right: positive      Imaging:  MRI C-Spine from 2014  Severe spondylosis with endplate osteophytes and posteriorly bulging discs causing severe central canal stenosis and myelomalacia at L3-4.  Also spinal stenosis without myelomalacia at C6-7.  There is diffuse facet arthropathy.    Labs:  BMP  Lab Results   Component Value Date     02/27/2017    K 3.9 02/27/2017     02/27/2017    CO2 29 02/27/2017    BUN 6 02/27/2017    CREATININE 0.8 " 02/27/2017    CALCIUM 9.5 02/27/2017    ANIONGAP 8 02/27/2017    ESTGFRAFRICA >60.0 02/27/2017    EGFRNONAA >60.0 02/27/2017     Lab Results   Component Value Date    ALT 19 02/27/2017    AST 22 02/27/2017    ALKPHOS 131 02/27/2017    BILITOT 0.6 02/27/2017       Diagnoses & Associated Orders:  Problem List Items Addressed This Visit     Cervical disc disease with myelopathy - Primary    Spinal stenosis of lumbar region    Relevant Orders    Pain Clinic Drug Screen    Myofascial pain syndrome, cervical    Chronic, continuous use of opioids    Relevant Orders    Pain Clinic Drug Screen    Opioid withdrawal         Aware Review:  11/28/17: Appropriate and consistent with patient's story.  Stable receipt of Morphine 100 TID #90 and Oxycodone 30 QID #120 for most of 2017 from PCP and previous pain provider.    MEDD Hx  - previous from outside pain physician: 480 mg  - 11/28/17: 135 mg    Recommendations & Interventions:   Procedures: None at this time.  He would be best served by surgical intervention as previous MANOLO provided no relief.  Medications:    - I will assume prescribing duty for opioids preliminarily.  Future refills are depending upon (1) records transfer from previous pain providers, (2) UDS today, (3) f/u with neurosurgery re: surgical treatment options, (4) relay of information from his neurosurgeon to this office regarding plans for cervical decompression.   - Opioid agreement signed today   - Patient understands that we will start at a significantly lower dose that he was previously taking and that I will wean him toward a goal MEDD of 50 mg or less with each visit with concurrent titration of neuropathic medications to manage his pain.  The reduction in pain medications will occur regardless of surgical intervention.   - Fill MS Contin 30 mg q8 #42 for 2 weeks supply   - Cont Oxycodone 10 mg Q4-6 prn - patient with ~40 tablets left from prescription filled on 11/15/17 which he was instructed to  make last until f/u visit in 2 weeks.  I plan to refill with medication at BID dosing at the next visit.   - Start Nortryptiline 25 mg QHS   - Start Lyrica 25 mg QHS x1 week then BID thereafter   - Start Clonidine 0.1 mg patch q7 days for withdrawal Sx   - Consider SNRI, Tizanidine at future visits  Imaging: Reviewed and discussed above.  Labs: Reviewed and appear appropriate  PT/OT: PT is likely inappropriate due to significant myelomalacia seen on MRI which has likely worsened since the image was obtained.  I will defer to his neurosurgeon.  Other:    - Records needed from previous pain clinic- release signed today.   - I will consider neuropsych eval for alternate modalities for pain control such as BF and CBT  Follow Up: RTC 2 weeks for med refill, med titration, opioid weaning    Andrzej Ellsworth Jr, MD  Interventional Pain Medicine / Anesthesiology    Disclaimer: This note was partly generated using dictation software which may occasionally result in transcription errors.

## 2017-11-28 NOTE — LETTER
November 28, 2017      Petrona New NP  [address] [facility] [address] [place] [address]           [facility]  [address] [facility] [address] [place] [address]  Phone: [phone]          Patient: Wild Galeano   MR Number: [id number]   YOB: 1965   Date of Visit: 11/28/2017       Dear Petrona New:    Thank you for referring Wild Galeano to me for evaluation. Attached you will find relevant portions of my assessment and plan of care.    If you have questions, please do not hesitate to call me. I look forward to following Wild Galeano along with you.    Sincerely,    Andrzej Ellsworth Jr., MD    Enclosure  CC:  No Recipients    If you would like to receive this communication electronically, please contact [email or web address] or [phone] to request more information on [facility] [employer] access.    For providers and/or their staff who would like to refer a patient to [facility], please contact us through our one-stop-shop provider referral line, [facility] , at [phone].    If you feel you have received this communication in error or would no longer like to receive these types of communications, please e-mail [email or web address]

## 2017-12-03 LAB

## 2017-12-12 ENCOUNTER — OFFICE VISIT (OUTPATIENT)
Dept: PAIN MEDICINE | Facility: CLINIC | Age: 52
End: 2017-12-12
Payer: MEDICARE

## 2017-12-12 VITALS
BODY MASS INDEX: 27.15 KG/M2 | DIASTOLIC BLOOD PRESSURE: 98 MMHG | WEIGHT: 173 LBS | SYSTOLIC BLOOD PRESSURE: 136 MMHG | RESPIRATION RATE: 20 BRPM | HEIGHT: 67 IN | HEART RATE: 90 BPM

## 2017-12-12 DIAGNOSIS — M50.00 CERVICAL DISC DISEASE WITH MYELOPATHY: ICD-10-CM

## 2017-12-12 DIAGNOSIS — F11.93 OPIOID WITHDRAWAL: ICD-10-CM

## 2017-12-12 DIAGNOSIS — G44.86 CERVICOGENIC HEADACHE: ICD-10-CM

## 2017-12-12 DIAGNOSIS — M48.062 SPINAL STENOSIS OF LUMBAR REGION WITH NEUROGENIC CLAUDICATION: ICD-10-CM

## 2017-12-12 DIAGNOSIS — F11.90 CHRONIC, CONTINUOUS USE OF OPIOIDS: ICD-10-CM

## 2017-12-12 DIAGNOSIS — M79.18 MYOFASCIAL PAIN SYNDROME, CERVICAL: Primary | ICD-10-CM

## 2017-12-12 PROCEDURE — 99214 OFFICE O/P EST MOD 30 MIN: CPT | Mod: S$GLB,,, | Performed by: PAIN MEDICINE

## 2017-12-12 PROCEDURE — 99499 UNLISTED E&M SERVICE: CPT | Mod: S$GLB,,, | Performed by: PAIN MEDICINE

## 2017-12-12 PROCEDURE — 99999 PR PBB SHADOW E&M-EST. PATIENT-LVL III: CPT | Mod: PBBFAC,,, | Performed by: PAIN MEDICINE

## 2017-12-12 RX ORDER — MORPHINE SULFATE 30 MG/1
30 TABLET, FILM COATED, EXTENDED RELEASE ORAL 3 TIMES DAILY
Qty: 90 TABLET | Refills: 0 | Status: CANCELLED | OUTPATIENT
Start: 2017-12-12 | End: 2018-01-11

## 2017-12-12 RX ORDER — PREGABALIN 50 MG/1
CAPSULE ORAL
Qty: 60 CAPSULE | Refills: 0 | Status: CANCELLED | OUTPATIENT
Start: 2017-12-12

## 2017-12-12 RX ORDER — MELOXICAM 15 MG/1
15 TABLET ORAL DAILY
Qty: 30 TABLET | Refills: 0 | Status: SHIPPED | OUTPATIENT
Start: 2017-12-12 | End: 2018-01-09 | Stop reason: SINTOL

## 2017-12-12 RX ORDER — OXYCODONE HYDROCHLORIDE 10 MG/1
TABLET ORAL
Qty: 45 TABLET | Refills: 0 | Status: SHIPPED | OUTPATIENT
Start: 2017-12-12 | End: 2018-01-09 | Stop reason: SDUPTHER

## 2017-12-12 RX ORDER — MORPHINE SULFATE 30 MG/1
30 TABLET, FILM COATED, EXTENDED RELEASE ORAL 3 TIMES DAILY
Qty: 90 TABLET | Refills: 0 | Status: SHIPPED | OUTPATIENT
Start: 2017-12-12 | End: 2018-01-09 | Stop reason: SDUPTHER

## 2017-12-12 RX ORDER — NORTRIPTYLINE HYDROCHLORIDE 50 MG/1
50 CAPSULE ORAL NIGHTLY
Qty: 30 CAPSULE | Refills: 0 | Status: SHIPPED | OUTPATIENT
Start: 2017-12-12 | End: 2018-01-09 | Stop reason: SINTOL

## 2017-12-12 RX ORDER — PREGABALIN 50 MG/1
50 CAPSULE ORAL 2 TIMES DAILY
Qty: 60 CAPSULE | Refills: 0 | Status: SHIPPED | OUTPATIENT
Start: 2017-12-12 | End: 2018-01-09 | Stop reason: SINTOL

## 2017-12-12 NOTE — PROGRESS NOTES
[facility] New Patient Evaluation    Referred by: GRACE New  Reason for referral: neck pain and opioid use    CC:   Chief Complaint   Patient presents with    Neck Pain     Interval Update:   #1 - 12/12/17 Mr Galeano returns today after 2 weeks.  UDS at the visit was CONSISTENT with Percocet as he stated he was taking.  We have not received records from his previous pain provider (Tanja) as requested; however, a new STAT records request was placed today.  He started Nortriptyline, Lyrica.  He presents with a letter from Dr. Agrawal's office stating that he was d/c'd from the clinic due to failure to appear for a pill count in late October 2017.    HPI: Wild Galeano is a pleasant 52 y.o. male who presented to my clinic complaining of neck pain and full body shooting pains as characterized below.  He was previously treated by Dr. Osman for pain management who left then he went to Dr. Agrawal.  He was recently evaluated by a physician at [facility] who proposes surgical intervention only on the level that is most stenotic though other surgeons have recommended full c-spine fusion.  His neck pain was previously treated with high-dose opioid therapy (  TID and Oxycodone 30 QID PRN (MEDD = 480)) which was abruptly cut recently when his PCP retired.  He reports diarrhea, nausea, vomiting, hot flashes, and difficulty concentrating for the past 2 weeks.    Location: neck due to severe spinal stenosis  Severity: Currently: 8/10   Typical Range: 6-8/10     Exacerbation: 8-9+/10   Onset: initially in 1992 with a  injury but acutely worsened in 2008  Quality: Tight, Sharp, Burning and stinging needles  Radiation: Bilat UE and LE  Axial/Extremity Percentage of Pain: 70/30  Exacerbating Factors: sitting and standing  Mitigating Factors: pain medications, heat, ice, laying down and hot showers  Assoc: denies night fever/night sweats, urinary incontinence or bowel incontinence BUT endorses  numbness and sensory loss in the hands and feet    Previous Therapies:  PT: none recently for neck; completed for hip post MAYI in march 2017  HEP:   TENS: helped very little  Injections:    - multiple MANOLO (3-4x) - didn't help  Surgery:    - none on spine   - s/p LEFT MAYI in March 2017  Medications:    - NSAIDS: Ibuprofen 800, Meloxicam 15 - no relief   - MSK Relaxants: SOMA, Baclofen   - TCAs: Current   - SNRIs: denies   - Topicals: denies   - Opioids: current   - Anti-convulsants: Gabapentin - tried without relief; Currently on Lyrica    Current Pain Medications:  1. MS Contin 30 mg TID - taking as prescribed  2. Roxicodone 10 mg 2-3x/day - taking 1-2 per day  3. Nortriptyline 25 - working well to help him sleep  4. Clonidine 0.1 patch - didn't start due to cost  5. Lyrica 25 mg BID - taking without SFX and it is helping pain    Full Medication List:    Current Outpatient Prescriptions:     chlorhexidine (HIBICLENS) 4 % external liquid, Apply topically daily as needed. Wash affected areas daily until lesions resolve., Disp: , Rfl: 0    morphine (MS CONTIN) 30 MG 12 hr tablet, Take 1 tablet (30 mg total) by mouth 3 (three) times daily., Disp: 42 tablet, Rfl: 0    nortriptyline (PAMELOR) 25 MG capsule, Take 1 capsule (25 mg total) by mouth every evening., Disp: 30 capsule, Rfl: 0    oxyCODONE (ROXICODONE) 10 mg Tab immediate release tablet, , Disp: , Rfl:     pregabalin (LYRICA) 25 MG capsule, Take 1 capsule daily for 1 week then twice daily thereafter., Disp: 60 capsule, Rfl: 0    cloNIDine 0.1 mg/24 hr td ptwk (CATAPRES) 0.1 mg/24 hr, Place 1 patch onto the skin every 7 days., Disp: 4 patch, Rfl: 0     Review of Systems:  Review of Systems   Constitutional: Negative for chills and fever.   HENT: Negative for nosebleeds.    Eyes: Negative for pain.   Respiratory: Negative for hemoptysis.    Cardiovascular: Negative for chest pain.   Gastrointestinal: Positive for diarrhea and vomiting. Negative for nausea.  "  Musculoskeletal: Positive for back pain and neck pain.   Skin: Negative for rash.   Neurological: Negative for dizziness.   Endo/Heme/Allergies: Does not bruise/bleed easily.   Psychiatric/Behavioral: The patient has insomnia.        Allergies:  Patient has no known allergies.     Medical History:  Past Medical History:   Diagnosis Date    Prediabetes     PTSD (post-traumatic stress disorder)     Smoker         Surgical History:  Past Surgical History:   Procedure Laterality Date    JOINT REPLACEMENT Left 03/2017    MAYI        Social History:  Social History     Social History    Marital status:      Spouse name: N/A    Number of children: N/A    Years of education: N/A     Occupational History    Not on file.     Social History Main Topics    Smoking status: Current Every Day Smoker     Packs/day: 2.00     Years: 36.00     Types: Cigarettes    Smokeless tobacco: Never Used    Alcohol use No    Drug use: No    Sexual activity: No     Other Topics Concern    Not on file     Social History Narrative    No narrative on file       Physical Exam:  Vitals:    12/12/17 0932   BP: (!) 136/98   Pulse: 90   Resp: 20   Weight: 78.5 kg (173 lb)   Height: 5' 7" (1.702 m)   PainSc:   8   PainLoc: Neck     General    Nursing note and vitals reviewed.  Constitutional: He is oriented to person, place, and time. He appears well-developed and well-nourished. No distress.   HENT:   Head: Normocephalic and atraumatic.   Nose: Nose normal.   Eyes: Conjunctivae and EOM are normal. Pupils are equal, round, and reactive to light. Right eye exhibits no discharge. Left eye exhibits no discharge. No scleral icterus.   Neck: No JVD present.   Cardiovascular: Intact distal pulses.    Pulmonary/Chest: Effort normal. No respiratory distress.   Abdominal: He exhibits no distension.   Neurological: He is alert and oriented to person, place, and time. Coordination normal.   Psychiatric: He has a normal mood and affect. His " behavior is normal. Judgment and thought content normal.     General Musculoskeletal Exam   Gait: abnormal and antalgic     Back (L-Spine & T-Spine) / Neck (C-Spine) Exam     Tenderness Right paramedian tenderness of the Upper C-Spine, Lower C-Spine and Occ. Left paramedian tenderness of the Upper C-Spine, Lower C-Spine and Occ.     Neck (C-Spine) Range of Motion   Flexion:     Severely limited  Extension: Severely limited  Right Lateral Bend: normal  Left Lateral Bend: normal  Right Rotation: normal  Left Rotation: normal    Neck (C-Spine) Tests   Spurling's Test   Left:  positive  Right: positive      Imaging:  MRI C-Spine from 2014  Severe spondylosis with endplate osteophytes and posteriorly bulging discs causing severe central canal stenosis and myelomalacia at L3-4.  Also spinal stenosis without myelomalacia at C6-7.  There is diffuse facet arthropathy.    Labs:  BMP  Lab Results   Component Value Date     02/27/2017    K 3.9 02/27/2017     02/27/2017    CO2 29 02/27/2017    BUN 6 02/27/2017    CREATININE 0.8 02/27/2017    CALCIUM 9.5 02/27/2017    ANIONGAP 8 02/27/2017    ESTGFRAFRICA >60.0 02/27/2017    EGFRNONAA >60.0 02/27/2017     Lab Results   Component Value Date    ALT 19 02/27/2017    AST 22 02/27/2017    ALKPHOS 131 02/27/2017    BILITOT 0.6 02/27/2017       Diagnoses & Associated Orders:  Problem List Items Addressed This Visit     Cervical disc disease with myelopathy    Spinal stenosis of lumbar region    Myofascial pain syndrome, cervical - Primary    Chronic, continuous use of opioids    Opioid withdrawal         Aware Review:  11/28/17: Appropriate and consistent with patient's story.  Stable receipt of Morphine 100 TID #90 and Oxycodone 30 QID #120 for most of 2017 from PCP and previous pain provider.  12/12/17: Appropriate and consistent.    MEDD Hx  - previous from outside pain physician: 480 mg  - 11/28/17: 135 mg  - 12/12/17: 112.5 mg (MSC 30 TID + Oxycontin 10  #45/month)    51 yo M with severe myelopathic pain due to cervical spinal cord compression by DDD currently being weaning from high dose opioids with increase in neuropathic meds simultaneously.  He was previously managed on high dose opioids alone without neuropathic medications.  He has a pending repeat MRI of the neck and follow-up appointment with a spine surgeon at [facility] to discuss surgical intervention which I strongly supported during our conversation today.    Recommendations & Interventions:   Procedures: None at this time.  He would be best served by surgical intervention as previous MANOLO provided no relief.  Medications:    - I will continue to refill medications and wean toward a goal of 50 MEDD or less.  The reduction in pain medications will occur regardless of surgical intervention.   - Future refills are dependent upon     (1) records transfer from previous pain providers - PENDING    (2) UDS result - completed    (3) f/u with neurosurgery re: surgical treatment options - PENDING    (4) relay of information from his neurosurgeon to this office regarding plans for cervical decompression - PENDING   - Opioid agreement on file    - Cont and Refill MS Contin 30 mg q8 #90 for 30 day supply (I plan to reduce this medication to 30/15/30 at the next visit)   - Cont, Reduce, and Refill Oxycodone 10 mg 1-2 tabs/day from #60 to #45 for 30 day supply   - Cont, Refill, and Increase Nortryptiline from 25 to 50 mg QHS   - Cont, Refill, and Increase Lyrica from 25 to 50 mg BID   - D/C Clonidine 0.1 mg patch q7 days for withdrawal Sx   - Start Meloxicam 15 mg daily #30   - Consider SNRI, Tizanidine at future visits  Imaging: Reviewed and discussed above.  Labs: reviewed and medications are appropriately dosed for current hepatorenal function.  UDS appropriate.  PT/OT: PT is likely inappropriate due to significant myelomalacia seen on MRI which has likely worsened since the image was obtained.  I will defer to his  neurosurgeon.  Other: I will consider neuropsych eval for alternate modalities for pain control such as BF and CBT  Follow Up:  RTC 4 weeks for med refill, med titration, and opioid weaning    Andrzej Ellsworth Jr, MD  Interventional Pain Medicine / Anesthesiology    Disclaimer: This note was partly generated using dictation software which may occasionally result in transcription errors.

## 2017-12-20 ENCOUNTER — TELEPHONE (OUTPATIENT)
Dept: GASTROENTEROLOGY | Facility: CLINIC | Age: 52
End: 2017-12-20

## 2018-01-09 ENCOUNTER — OFFICE VISIT (OUTPATIENT)
Dept: PAIN MEDICINE | Facility: CLINIC | Age: 53
End: 2018-01-09
Payer: MEDICARE

## 2018-01-09 VITALS
HEART RATE: 103 BPM | SYSTOLIC BLOOD PRESSURE: 115 MMHG | BODY MASS INDEX: 26.37 KG/M2 | RESPIRATION RATE: 20 BRPM | DIASTOLIC BLOOD PRESSURE: 89 MMHG | WEIGHT: 168 LBS | HEIGHT: 67 IN

## 2018-01-09 DIAGNOSIS — M79.18 MYOFASCIAL PAIN SYNDROME, CERVICAL: ICD-10-CM

## 2018-01-09 DIAGNOSIS — M50.00 CERVICAL DISC DISEASE WITH MYELOPATHY: Primary | ICD-10-CM

## 2018-01-09 DIAGNOSIS — G89.4 CHRONIC PAIN SYNDROME: ICD-10-CM

## 2018-01-09 DIAGNOSIS — F11.90 CHRONIC, CONTINUOUS USE OF OPIOIDS: ICD-10-CM

## 2018-01-09 PROCEDURE — 99214 OFFICE O/P EST MOD 30 MIN: CPT | Mod: S$GLB,,, | Performed by: PAIN MEDICINE

## 2018-01-09 PROCEDURE — 80307 DRUG TEST PRSMV CHEM ANLYZR: CPT

## 2018-01-09 PROCEDURE — 99999 PR PBB SHADOW E&M-EST. PATIENT-LVL III: CPT | Mod: PBBFAC,,, | Performed by: PAIN MEDICINE

## 2018-01-09 RX ORDER — MORPHINE SULFATE 15 MG/1
15 TABLET, FILM COATED, EXTENDED RELEASE ORAL 2 TIMES DAILY
Qty: 30 TABLET | Refills: 0 | Status: CANCELLED | OUTPATIENT
Start: 2018-01-09

## 2018-01-09 RX ORDER — PREGABALIN 100 MG/1
100 CAPSULE ORAL 2 TIMES DAILY
Qty: 60 CAPSULE | Refills: 0 | Status: CANCELLED | OUTPATIENT
Start: 2018-01-09

## 2018-01-09 RX ORDER — MORPHINE SULFATE 30 MG/1
30 TABLET, FILM COATED, EXTENDED RELEASE ORAL EVERY 8 HOURS PRN
Qty: 90 TABLET | Refills: 0 | Status: SHIPPED | OUTPATIENT
Start: 2018-01-09 | End: 2018-03-22

## 2018-01-09 RX ORDER — OXYCODONE HYDROCHLORIDE 10 MG/1
10 TABLET ORAL
Qty: 30 TABLET | Refills: 0 | Status: SHIPPED | OUTPATIENT
Start: 2018-01-09 | End: 2018-03-22

## 2018-01-09 RX ORDER — MELOXICAM 15 MG/1
15 TABLET ORAL DAILY
Qty: 30 TABLET | Refills: 0 | Status: CANCELLED | OUTPATIENT
Start: 2018-01-09

## 2018-01-09 RX ORDER — NORTRIPTYLINE HYDROCHLORIDE 50 MG/1
50 CAPSULE ORAL NIGHTLY
Qty: 30 CAPSULE | Refills: 0 | Status: CANCELLED | OUTPATIENT
Start: 2018-01-09 | End: 2019-01-09

## 2018-01-09 NOTE — PROGRESS NOTES
[facility] New Patient Evaluation    Referred by: GRACE New  Reason for referral: neck pain and opioid use    CC:   Chief Complaint   Patient presents with    Neck Pain     Interval Update:   #2 - 1/9/18 His pain is unchanged from before.  He didn't start meloxicam due to the label stating risk of stroke.  He thinks the Lyrica or Nortriptyline may have caused suicidal ideations so he stopped both of those as well.  He denies suicidal ideations at this time.  He continues to take opioid medications.  Regarding surgical plans, he has stopped pursuing invention from [facility] and is amenable to assessment by [facility].    #1 - 12/12/17 Mr Galeano returns today after 2 weeks.  UDS at the visit was CONSISTENT with Percocet as he stated he was taking.  We have not received records from his previous pain provider (Tanja) as requested; however, a new STAT records request was placed today.  He started Nortriptyline, Lyrica.  He presents with a letter from Dr. Agrawal's office stating that he was d/c'd from the clinic due to failure to appear for a pill count in late October 2017.    Background: Wild Galeano is a pleasant 52 y.o. male who presented to my clinic complaining of neck pain and full body shooting pains as characterized below.  He was previously treated by Dr. Osman for pain management who left then he went to Dr. Agrawal.  He was recently evaluated by a physician at [facility] who proposes surgical intervention only on the level that is most stenotic though other surgeons have recommended full c-spine fusion.  His neck pain was previously treated with high-dose opioid therapy (  TID and Oxycodone 30 QID PRN (MEDD = 480)) which was abruptly cut recently when his PCP retired.  He reports diarrhea, nausea, vomiting, hot flashes, and difficulty concentrating for the past 2 weeks.    Location: neck due to severe spinal stenosis  Severity: Currently: 8/10   Typical Range:  6-8/10     Exacerbation: 8-9+/10   Onset: initially in 1992 with a  injury but acutely worsened in 2008  Quality: Tight, Sharp, Burning and stinging needles  Radiation: Bilat UE and LE  Axial/Extremity Percentage of Pain: 70/30  Exacerbating Factors: sitting and standing  Mitigating Factors: pain medications, heat, ice, laying down and hot showers  Assoc: denies night fever/night sweats, urinary incontinence or bowel incontinence BUT endorses numbness and sensory loss in the hands and feet    Previous Therapies:  PT: none recently for neck; completed for hip post MAYI in march 2017  HEP:   TENS: helped very little  Injections:    - multiple MANOLO (3-4x) - didn't help  Surgery:    - none on spine   - s/p LEFT MAYI in March 2017  Medications:    - NSAIDS: Ibuprofen 800, Meloxicam 15 - no relief   - MSK Relaxants: SOMA, Baclofen   - TCAs: Tried but stopped due to increase suicidal ideation   - SNRIs: denies   - Topicals: denies   - Opioids: current   - Anti-convulsants: Gabapentin - tried without relief; Lyrica - stopped due to suicidal ideation    Current Pain Medications:  1. MS Contin 30 mg TID - taking as prescribed  2. Roxicodone 10 mg 2-3x/day - taking 1-2 per day  3. Nortriptyline 25 - working well to help him sleep  4. Lyrica 25 mg BID - taking without SFX and it is helping pain    Full Medication List:    Current Outpatient Prescriptions:     chlorhexidine (HIBICLENS) 4 % external liquid, Apply topically daily as needed. Wash affected areas daily until lesions resolve., Disp: , Rfl: 0    morphine (MS CONTIN) 30 MG 12 hr tablet, Take 1 tablet (30 mg total) by mouth 3 (three) times daily., Disp: 90 tablet, Rfl: 0    oxyCODONE (ROXICODONE) 10 mg Tab immediate release tablet, Take 1-2 tablets per day for pain as needed.  #45 tablets are a 30 day supply., Disp: 45 tablet, Rfl: 0     Review of Systems:  Review of Systems   Constitutional: Negative for chills and fever.   HENT: Negative for nosebleeds.   "  Eyes: Negative for pain.   Respiratory: Negative for hemoptysis.    Cardiovascular: Negative for chest pain.   Gastrointestinal: Positive for diarrhea and vomiting. Negative for nausea.   Musculoskeletal: Positive for back pain and neck pain.   Skin: Negative for rash.   Neurological: Negative for dizziness.   Endo/Heme/Allergies: Does not bruise/bleed easily.   Psychiatric/Behavioral: The patient has insomnia.        Allergies:  Patient has no known allergies.     Medical History:  Past Medical History:   Diagnosis Date    Prediabetes     PTSD (post-traumatic stress disorder)     Smoker         Surgical History:  Past Surgical History:   Procedure Laterality Date    JOINT REPLACEMENT Left 03/2017    MAYI        Social History:  Social History     Social History    Marital status:      Spouse name: N/A    Number of children: N/A    Years of education: N/A     Occupational History    Not on file.     Social History Main Topics    Smoking status: Current Every Day Smoker     Packs/day: 2.00     Years: 36.00     Types: Cigarettes    Smokeless tobacco: Never Used    Alcohol use No    Drug use: No    Sexual activity: No     Other Topics Concern    Not on file     Social History Narrative    No narrative on file       Physical Exam:  Vitals:    01/09/18 1522   BP: 115/89   Pulse: 103   Resp: 20   Weight: 76.2 kg (167 lb 15.9 oz)   Height: 5' 7" (1.702 m)   PainSc:   8   PainLoc: Neck     General    Nursing note and vitals reviewed.  Constitutional: He is oriented to person, place, and time. He appears well-developed and well-nourished. No distress.   HENT:   Head: Normocephalic and atraumatic.   Nose: Nose normal.   Eyes: Conjunctivae and EOM are normal. Pupils are equal, round, and reactive to light. Right eye exhibits no discharge. Left eye exhibits no discharge. No scleral icterus.   Neck: No JVD present.   Cardiovascular: Intact distal pulses.    Pulmonary/Chest: Effort normal. No respiratory " distress.   Abdominal: He exhibits no distension.   Neurological: He is alert and oriented to person, place, and time. Coordination normal.   Psychiatric: He has a normal mood and affect. His behavior is normal. Judgment and thought content normal.     General Musculoskeletal Exam   Gait: abnormal and antalgic     Back (L-Spine & T-Spine) / Neck (C-Spine) Exam     Tenderness Right paramedian tenderness of the Upper C-Spine, Lower C-Spine and Occ. Left paramedian tenderness of the Upper C-Spine, Lower C-Spine and Occ.     Neck (C-Spine) Range of Motion   Flexion:     Severely limited  Extension: Severely limited  Right Lateral Bend: normal  Left Lateral Bend: normal  Right Rotation: normal  Left Rotation: normal    Neck (C-Spine) Tests   Spurling's Test   Left:  positive  Right: positive      Imaging:  MRI C-Spine from 2014  Severe spondylosis with endplate osteophytes and posteriorly bulging discs causing severe central canal stenosis and myelomalacia at L3-4.  Also spinal stenosis without myelomalacia at C6-7.  There is diffuse facet arthropathy.    Labs:  BMP  Lab Results   Component Value Date     02/27/2017    K 3.9 02/27/2017     02/27/2017    CO2 29 02/27/2017    BUN 6 02/27/2017    CREATININE 0.8 02/27/2017    CALCIUM 9.5 02/27/2017    ANIONGAP 8 02/27/2017    ESTGFRAFRICA >60.0 02/27/2017    EGFRNONAA >60.0 02/27/2017     Lab Results   Component Value Date    ALT 19 02/27/2017    AST 22 02/27/2017    ALKPHOS 131 02/27/2017    BILITOT 0.6 02/27/2017       Diagnoses & Associated Orders:  Problem List Items Addressed This Visit     Cervical disc disease with myelopathy - Primary    Myofascial pain syndrome, cervical    Chronic, continuous use of opioids    Chronic pain syndrome        UDS Review:  11/28/17: Consistent and appropriate     Aware Review:  11/28/17: Appropriate and consistent with patient's story.  Stable receipt of Morphine 100 TID #90 and Oxycodone 30 QID #120 for most of 2017 from  PCP and previous pain provider.  12/12/17: Appropriate and consistent.  1/9/18: Reviewed and appropriate    MEDD Hx  - previous from outside pain physician: 480 mg  - 11/28/17: 135 mg  - 12/12/17: 112.5 mg (MSC 30 TID + Oxycontin 10 #45/month)  - 1/9/18: 105 mg (MSC 30 TID + Oxycontin 10 mg daily prn #30/month)    51 yo M with severe myelopathic pain due to cervical spinal cord compression by DDD currently being weaning from high dose opioids with increase in neuropathic meds simultaneously.  He is high risk for aberrant     Recommendations & Interventions:   Referrals: Referred to [facility] in [place] for eval or surgical options  Medications:    - I will continue to refill medications and wean toward a goal of 50 MEDD or less.  The reduction in pain medications will occur regardless of surgical intervention.   - Future refills are dependent upon     (1) records transfer from previous pain providers - PENDING    (2) UDS result - completed    (3) f/u with neurosurgery re: surgical treatment options - PENDING    (4) relay of information from his neurosurgeon to this office regarding plans for cervical decompression - PENDING   - Opioid agreement on file    - Cont and Refill MS Contin 30 mg q8 #90 for 30 day supply (I plan to reduce this medication to 30/15/30 at the next visit)   - Cont, Reduce, and Refill Oxycodone 10 mg 1-2 tabs/day from #30 for 30 day supply   - Start Tizanidine 4 mg TID PRN for muscle relaxation and analgesic effect via alpha antagonism   - I recommend that he restart Meloxicam, Nortriptyline, and Lyrica but he declines due to fear of side effects   - He declines antidepressants (SNRIs) due to fear of exacerbating depression or SI   - UDS today  Imaging: Reviewed and discussed above.  Labs: reviewed and medications are appropriately dosed for current hepatorenal function.  UDS appropriate.  PT/OT: PT is likely inappropriate due to significant myelomalacia seen on MRI which has likely  worsened since the image was obtained.  I will defer to his neurosurgeon.  Other: I will consider neuropsych eval for alternate modalities for pain control such as BF and CBT  Follow Up:  RTC 4 weeks for med refill, med titration, and opioid weaning    Andrzej Ellsworth Jr, MD  Interventional Pain Medicine / Anesthesiology    Disclaimer: This note was partly generated using dictation software which may occasionally result in transcription errors.

## 2018-01-16 ENCOUNTER — TELEPHONE (OUTPATIENT)
Dept: PAIN MEDICINE | Facility: CLINIC | Age: 53
End: 2018-01-16

## 2018-01-16 LAB

## 2018-01-16 NOTE — TELEPHONE ENCOUNTER
I called Mr Galeano on his listed cell to inform him of the UDS results and to offer to connect him to resources for addiction as I will no longer prescribe opioid medications.  Further, I have not received any records from his previous pain providers, which was an explicitly stated requirement for continuation of opioid therapy.  He did not answer so I left a message stating the above and requesting that he call the office so that we can share contact information for local addiction resources with him.    I plan to continue working with him on a non-opioid regimen for control of his pain.    I will have my office mail him a letter stating the above along with a packet for addiction resources.    Andrzej Ellsworth Jr, MD  Interventional Pain Medicine / Anesthesiology

## 2018-02-08 ENCOUNTER — PES CALL (OUTPATIENT)
Dept: ADMINISTRATIVE | Facility: CLINIC | Age: 53
End: 2018-02-08

## 2018-03-07 ENCOUNTER — PATIENT OUTREACH (OUTPATIENT)
Dept: ADMINISTRATIVE | Facility: HOSPITAL | Age: 53
End: 2018-03-07

## 2018-03-07 NOTE — LETTER
March 7, 2018    Wild Galeano  [address] [place] [address] [facility]  [address]  [facility] [address]  Phone: [phone] Dear Mr. Galeano:    [facility] is committed to your overall health.  To help you get the most out of each of your visits, we will review your information to make sure you are up to date on all of your recommended tests and/or procedures.      Dr. Alejo     has found that you may be due for:    Cholesterol check (Lipid Panel)  Colonoscopy (Colorectal screening)  Pneumonia immunization    If you have had any of the above done at another facility, please bring the records or information with you so that your record at [facility] will be complete.     If you are currently taking medication, please bring it with you to your appointment for review.    If you have any questions or concerns, please don't hesitate to call.    Sincerely,  Rashida Tan  Clinical Care Coordinator  [facility] [address] [place] [address]  Phone: [phone]   Fax: [phone]

## 2018-03-07 NOTE — PROGRESS NOTES
Health Maintenance Due   Topic Date Due    Lipid Panel  1965    Pneumococcal PPSV23 (Medium Risk) (1) 03/30/1985    Colonoscopy  03/30/2017     Pre-visit outreach via mail

## 2018-03-22 ENCOUNTER — OFFICE VISIT (OUTPATIENT)
Dept: FAMILY MEDICINE | Facility: CLINIC | Age: 53
End: 2018-03-22
Payer: MEDICARE

## 2018-03-22 ENCOUNTER — DOCUMENTATION ONLY (OUTPATIENT)
Dept: FAMILY MEDICINE | Facility: CLINIC | Age: 53
End: 2018-03-22

## 2018-03-22 ENCOUNTER — TELEPHONE (OUTPATIENT)
Dept: FAMILY MEDICINE | Facility: CLINIC | Age: 53
End: 2018-03-22

## 2018-03-22 VITALS
OXYGEN SATURATION: 97 % | SYSTOLIC BLOOD PRESSURE: 104 MMHG | TEMPERATURE: 98 F | BODY MASS INDEX: 26.16 KG/M2 | RESPIRATION RATE: 20 BRPM | WEIGHT: 166.69 LBS | HEART RATE: 92 BPM | DIASTOLIC BLOOD PRESSURE: 84 MMHG | HEIGHT: 67 IN

## 2018-03-22 DIAGNOSIS — Z00.00 ROUTINE PHYSICAL EXAMINATION: Primary | ICD-10-CM

## 2018-03-22 DIAGNOSIS — R73.03 PREDIABETES: ICD-10-CM

## 2018-03-22 DIAGNOSIS — Z12.11 SCREEN FOR COLON CANCER: ICD-10-CM

## 2018-03-22 DIAGNOSIS — N30.00 ACUTE CYSTITIS WITHOUT HEMATURIA: ICD-10-CM

## 2018-03-22 DIAGNOSIS — Z12.5 ENCOUNTER FOR SCREENING FOR MALIGNANT NEOPLASM OF PROSTATE: ICD-10-CM

## 2018-03-22 DIAGNOSIS — Z13.6 SCREENING FOR CARDIOVASCULAR CONDITION: ICD-10-CM

## 2018-03-22 DIAGNOSIS — N39.0 URINARY TRACT INFECTION WITHOUT HEMATURIA, SITE UNSPECIFIED: ICD-10-CM

## 2018-03-22 DIAGNOSIS — G89.29 OTHER CHRONIC PAIN: ICD-10-CM

## 2018-03-22 LAB
BILIRUB UR QL STRIP: NEGATIVE
CLARITY UR: CLEAR
COLOR UR: YELLOW
GLUCOSE UR QL STRIP: NEGATIVE
HGB UR QL STRIP: NEGATIVE
KETONES UR QL STRIP: NEGATIVE
LEUKOCYTE ESTERASE UR QL STRIP: NEGATIVE
NITRITE UR QL STRIP: NEGATIVE
PH UR STRIP: 6 [PH] (ref 5–8)
PROT UR QL STRIP: NEGATIVE
SP GR UR STRIP: 1.01 (ref 1–1.03)
URN SPEC COLLECT METH UR: NORMAL

## 2018-03-22 PROCEDURE — 99396 PREV VISIT EST AGE 40-64: CPT | Mod: S$GLB,,, | Performed by: INTERNAL MEDICINE

## 2018-03-22 PROCEDURE — 87086 URINE CULTURE/COLONY COUNT: CPT

## 2018-03-22 PROCEDURE — 81003 URINALYSIS AUTO W/O SCOPE: CPT | Mod: PO

## 2018-03-22 PROCEDURE — 99999 PR PBB SHADOW E&M-EST. PATIENT-LVL IV: CPT | Mod: PBBFAC,,, | Performed by: INTERNAL MEDICINE

## 2018-03-22 RX ORDER — MORPHINE SULFATE 15 MG/1
1 TABLET, FILM COATED, EXTENDED RELEASE ORAL 2 TIMES DAILY
COMMUNITY
Start: 2018-03-08 | End: 2019-07-02

## 2018-03-22 RX ORDER — CIPROFLOXACIN 500 MG/1
500 TABLET ORAL 2 TIMES DAILY
Qty: 14 TABLET | Refills: 0 | Status: SHIPPED | OUTPATIENT
Start: 2018-03-22 | End: 2018-03-29

## 2018-03-22 NOTE — PROGRESS NOTES
Subjective:       Patient ID: Wild Galeano is a 52 y.o. male.    Chief Complaint: Annual Exam    Here for routine health maintenance.    Chronic pain 2nd to left hip and cervical spinal stenosis.  On heavy opioids.  Was seeing our pain Dr Joel, who decreased his opium dose and offered alternatives to supplement.   Patient did not do other tx - nsaid, gabapentin for potential side affects.  Patient is resistant to surgery and only wants opioids.  He is asking for a referral to a different pain Dr lisa - [place].  He will consider meeting with a neurosurgeon.  He also has painful erections and was referred to a specialist urologist who he has not met with yet.  Avoids sex with his wife 2nd to this.   PreDM - managing with life style changes  Complains of burning with urination for 3 days.        Review of Systems   Constitutional: Negative for appetite change and fever.   HENT: Negative for nosebleeds and trouble swallowing.    Eyes: Negative for discharge and visual disturbance.   Respiratory: Negative for choking and shortness of breath.    Cardiovascular: Negative for chest pain and palpitations.   Gastrointestinal: Negative for abdominal pain, nausea and vomiting.   Genitourinary: Positive for dysuria, flank pain and penile pain.   Musculoskeletal: Positive for back pain. Negative for arthralgias and joint swelling.   Skin: Negative for rash and wound.   Neurological: Negative for dizziness and syncope.   Psychiatric/Behavioral: Negative for confusion and dysphoric mood.       Objective:      Vitals:    03/22/18 0750   BP: 104/84   Pulse: 92   Resp: 20   Temp: 97.7 °F (36.5 °C)     Physical Exam   Constitutional: He appears well-nourished.   Eyes: Conjunctivae and EOM are normal.   Neck: Trachea normal and normal range of motion. No thyromegaly present.   Cardiovascular: Normal heart sounds.    Edema negative   Pulmonary/Chest: Effort normal and breath sounds normal.   Abdominal: Soft. There is  no hepatomegaly.   Musculoskeletal:   + TTP left CVA     Neurological: No cranial nerve deficit.   DTR decreased bilateral  Hunched gait   Skin: Skin is warm, dry and intact.   Psychiatric: He has a normal mood and affect.   Alert and Oriented    Vitals reviewed.        Assessment:       1. Routine physical examination    2. Other chronic pain    3. Urinary tract infection without hematuria, site unspecified    4. Prediabetes    5. Screening for cardiovascular condition    6. Encounter for screening for malignant neoplasm of prostate    7. Screen for colon cancer        Plan:       Routine physical examination  -     Comprehensive metabolic panel; Future; Expected date: 03/22/2018    Other chronic pain  -     Ambulatory consult to Pain Clinic  -     Comprehensive metabolic panel; Future; Expected date: 03/22/2018    Urinary tract infection without hematuria, site unspecified  -     Comprehensive metabolic panel; Future; Expected date: 03/22/2018  -     Urinalysis; Future; Expected date: 03/22/2018  -     Urine culture; Future; Expected date: 03/22/2018  -     ciprofloxacin HCl (CIPRO) 500 MG tablet; Take 1 tablet (500 mg total) by mouth 2 (two) times daily.  Dispense: 14 tablet; Refill: 0    Prediabetes  -     Comprehensive metabolic panel; Future; Expected date: 03/22/2018  -     Hemoglobin A1c; Future; Expected date: 03/22/2018    Screening for cardiovascular condition  -     Comprehensive metabolic panel; Future; Expected date: 03/22/2018  -     Lipid panel; Future; Expected date: 03/22/2018    Encounter for screening for malignant neoplasm of prostate  -     PSA, Screening; Future; Expected date: 03/22/2018    Screen for colon cancer  -     Case request GI: COLONOSCOPY    Other orders  -     Cancel: Ambulatory referral to Pain Clinic      Medication List with Changes/Refills   New Medications    CIPROFLOXACIN HCL (CIPRO) 500 MG TABLET    Take 1 tablet (500 mg total) by mouth 2 (two) times daily.   Current  "Medications    CHLORHEXIDINE (HIBICLENS) 4 % EXTERNAL LIQUID    Apply topically daily as needed. Wash affected areas daily until lesions resolve.    MORPHINE (MS CONTIN) 15 MG 12 HR TABLET    Take 1 tablet by mouth 2 (two) times daily.   Discontinued Medications    MORPHINE (MS CONTIN) 30 MG 12 HR TABLET    Take 1 tablet (30 mg total) by mouth every 8 (eight) hours as needed for Pain.    OXYCODONE (ROXICODONE) 10 MG TAB IMMEDIATE RELEASE TABLET    Take 1 tablet (10 mg total) by mouth every 24 hours as needed for Pain.     Wellness reviewed  Pain referral given.  Advised see specialist.  Advised I would not rx narcotics to him.           Counseled on regular exercise, maintenance of a healthy weight, balanced diet rich in fruits/vegetables and lean protein, and avoidance of unhealthy habits like smoking and excessive alcohol intake.   Also, counseled on importance of being compliant with medication, health appointments, diet and exercise.     Follow-up in about 1 year (around 3/22/2019). c    "This note will not be shared with the patient."  "

## 2018-03-22 NOTE — PROGRESS NOTES
On 3/17/18, it appears that  Dr. Peña entered UA and culture orders for this pt.  Dr. Alejo ordered the same today and I accidentally released Dr. Siegel orders for collection.

## 2018-03-22 NOTE — TELEPHONE ENCOUNTER
Dr. Peña,    On 3/17/18 it appears that you ordered a UA and culture on this pt.   Dr. Alejo ordered the same today and I released your orders today by mistake.  Do want to cancel your orders?    Please advise.

## 2018-03-24 LAB — BACTERIA UR CULT: NO GROWTH

## 2018-04-05 ENCOUNTER — TELEPHONE (OUTPATIENT)
Dept: GASTROENTEROLOGY | Facility: CLINIC | Age: 53
End: 2018-04-05

## 2018-06-19 ENCOUNTER — TELEPHONE (OUTPATIENT)
Dept: FAMILY MEDICINE | Facility: CLINIC | Age: 53
End: 2018-06-19

## 2018-09-11 NOTE — TELEPHONE ENCOUNTER
Call & tell him his lab work showed that he is in the pre-diabetes range; Follow diabetic diet, decrease carb and sugar intake, start exercising regularly.    Spontaneous, unlabored and symmetrical

## 2019-06-17 ENCOUNTER — PES CALL (OUTPATIENT)
Dept: ADMINISTRATIVE | Facility: CLINIC | Age: 54
End: 2019-06-17

## 2019-07-02 ENCOUNTER — OFFICE VISIT (OUTPATIENT)
Dept: FAMILY MEDICINE | Facility: CLINIC | Age: 54
End: 2019-07-02
Payer: MEDICARE

## 2019-07-02 ENCOUNTER — LAB VISIT (OUTPATIENT)
Dept: LAB | Facility: HOSPITAL | Age: 54
End: 2019-07-02
Attending: NURSE PRACTITIONER
Payer: MEDICARE

## 2019-07-02 VITALS
HEIGHT: 67 IN | HEART RATE: 78 BPM | BODY MASS INDEX: 24.74 KG/M2 | SYSTOLIC BLOOD PRESSURE: 138 MMHG | DIASTOLIC BLOOD PRESSURE: 80 MMHG | OXYGEN SATURATION: 97 % | WEIGHT: 157.63 LBS

## 2019-07-02 DIAGNOSIS — Z12.11 SCREENING FOR COLON CANCER: ICD-10-CM

## 2019-07-02 DIAGNOSIS — M48.062 SPINAL STENOSIS OF LUMBAR REGION WITH NEUROGENIC CLAUDICATION: ICD-10-CM

## 2019-07-02 DIAGNOSIS — Z00.00 ENCOUNTER FOR PREVENTIVE HEALTH EXAMINATION: Primary | ICD-10-CM

## 2019-07-02 DIAGNOSIS — G44.86 CERVICOGENIC HEADACHE: ICD-10-CM

## 2019-07-02 DIAGNOSIS — F17.200 SMOKER: ICD-10-CM

## 2019-07-02 DIAGNOSIS — R73.03 PREDIABETES: ICD-10-CM

## 2019-07-02 DIAGNOSIS — M50.00 CERVICAL DISC DISEASE WITH MYELOPATHY: ICD-10-CM

## 2019-07-02 DIAGNOSIS — G89.4 CHRONIC PAIN SYNDROME: ICD-10-CM

## 2019-07-02 DIAGNOSIS — Z12.5 ENCOUNTER FOR SCREENING FOR MALIGNANT NEOPLASM OF PROSTATE: ICD-10-CM

## 2019-07-02 DIAGNOSIS — Z13.6 ENCOUNTER FOR SCREENING FOR CARDIOVASCULAR DISORDERS: ICD-10-CM

## 2019-07-02 LAB
ALBUMIN SERPL BCP-MCNC: 4.3 G/DL (ref 3.5–5.2)
ALP SERPL-CCNC: 108 U/L (ref 55–135)
ALT SERPL W/O P-5'-P-CCNC: 31 U/L (ref 10–44)
ANION GAP SERPL CALC-SCNC: 10 MMOL/L (ref 8–16)
AST SERPL-CCNC: 39 U/L (ref 10–40)
BASOPHILS # BLD AUTO: 0.04 K/UL (ref 0–0.2)
BASOPHILS NFR BLD: 0.6 % (ref 0–1.9)
BILIRUB SERPL-MCNC: 0.5 MG/DL (ref 0.1–1)
BUN SERPL-MCNC: 5 MG/DL (ref 6–20)
CALCIUM SERPL-MCNC: 9.8 MG/DL (ref 8.7–10.5)
CHLORIDE SERPL-SCNC: 104 MMOL/L (ref 95–110)
CHOLEST SERPL-MCNC: 180 MG/DL (ref 120–199)
CHOLEST/HDLC SERPL: 6.2 {RATIO} (ref 2–5)
CO2 SERPL-SCNC: 28 MMOL/L (ref 23–29)
COMPLEXED PSA SERPL-MCNC: 0.57 NG/ML (ref 0–4)
CREAT SERPL-MCNC: 0.8 MG/DL (ref 0.5–1.4)
DIFFERENTIAL METHOD: ABNORMAL
EOSINOPHIL # BLD AUTO: 0.2 K/UL (ref 0–0.5)
EOSINOPHIL NFR BLD: 2.5 % (ref 0–8)
ERYTHROCYTE [DISTWIDTH] IN BLOOD BY AUTOMATED COUNT: 12.9 % (ref 11.5–14.5)
EST. GFR  (AFRICAN AMERICAN): >60 ML/MIN/1.73 M^2
EST. GFR  (NON AFRICAN AMERICAN): >60 ML/MIN/1.73 M^2
GLUCOSE SERPL-MCNC: 99 MG/DL (ref 70–110)
HCT VFR BLD AUTO: 47.9 % (ref 40–54)
HDLC SERPL-MCNC: 29 MG/DL (ref 40–75)
HDLC SERPL: 16.1 % (ref 20–50)
HGB BLD-MCNC: 15.4 G/DL (ref 14–18)
IMM GRANULOCYTES # BLD AUTO: 0.01 K/UL (ref 0–0.04)
IMM GRANULOCYTES NFR BLD AUTO: 0.1 % (ref 0–0.5)
LDLC SERPL CALC-MCNC: 128.4 MG/DL (ref 63–159)
LYMPHOCYTES # BLD AUTO: 2.5 K/UL (ref 1–4.8)
LYMPHOCYTES NFR BLD: 37.6 % (ref 18–48)
MCH RBC QN AUTO: 31.4 PG (ref 27–31)
MCHC RBC AUTO-ENTMCNC: 32.2 G/DL (ref 32–36)
MCV RBC AUTO: 98 FL (ref 82–98)
MONOCYTES # BLD AUTO: 0.4 K/UL (ref 0.3–1)
MONOCYTES NFR BLD: 6.4 % (ref 4–15)
NEUTROPHILS # BLD AUTO: 3.5 K/UL (ref 1.8–7.7)
NEUTROPHILS NFR BLD: 52.8 % (ref 38–73)
NONHDLC SERPL-MCNC: 151 MG/DL
NRBC BLD-RTO: 0 /100 WBC
PLATELET # BLD AUTO: 241 K/UL (ref 150–350)
PMV BLD AUTO: 10.2 FL (ref 9.2–12.9)
POTASSIUM SERPL-SCNC: 4.5 MMOL/L (ref 3.5–5.1)
PROT SERPL-MCNC: 8 G/DL (ref 6–8.4)
RBC # BLD AUTO: 4.91 M/UL (ref 4.6–6.2)
SODIUM SERPL-SCNC: 142 MMOL/L (ref 136–145)
TRIGL SERPL-MCNC: 113 MG/DL (ref 30–150)
TSH SERPL DL<=0.005 MIU/L-ACNC: 1.44 UIU/ML (ref 0.4–4)
WBC # BLD AUTO: 6.72 K/UL (ref 3.9–12.7)

## 2019-07-02 PROCEDURE — G0439 PR MEDICARE ANNUAL WELLNESS SUBSEQUENT VISIT: ICD-10-PCS | Mod: HCNC,S$GLB,, | Performed by: NURSE PRACTITIONER

## 2019-07-02 PROCEDURE — 99999 PR PBB SHADOW E&M-EST. PATIENT-LVL IV: ICD-10-PCS | Mod: PBBFAC,HCNC,, | Performed by: NURSE PRACTITIONER

## 2019-07-02 PROCEDURE — 80053 COMPREHEN METABOLIC PANEL: CPT | Mod: HCNC

## 2019-07-02 PROCEDURE — 99999 PR PBB SHADOW E&M-EST. PATIENT-LVL IV: CPT | Mod: PBBFAC,HCNC,, | Performed by: NURSE PRACTITIONER

## 2019-07-02 PROCEDURE — 85025 COMPLETE CBC W/AUTO DIFF WBC: CPT | Mod: HCNC

## 2019-07-02 PROCEDURE — 36415 COLL VENOUS BLD VENIPUNCTURE: CPT | Mod: HCNC,PO

## 2019-07-02 PROCEDURE — 84443 ASSAY THYROID STIM HORMONE: CPT | Mod: HCNC

## 2019-07-02 PROCEDURE — G0439 PPPS, SUBSEQ VISIT: HCPCS | Mod: HCNC,S$GLB,, | Performed by: NURSE PRACTITIONER

## 2019-07-02 PROCEDURE — 84153 ASSAY OF PSA TOTAL: CPT | Mod: HCNC

## 2019-07-02 PROCEDURE — 80061 LIPID PANEL: CPT | Mod: HCNC

## 2019-07-02 RX ORDER — HYDROCODONE BITARTRATE AND ACETAMINOPHEN 7.5; 325 MG/1; MG/1
TABLET ORAL
Refills: 0 | COMMUNITY
Start: 2019-06-08 | End: 2019-07-02

## 2019-07-02 RX ORDER — CLINDAMYCIN HYDROCHLORIDE 150 MG/1
CAPSULE ORAL
Refills: 0 | COMMUNITY
Start: 2019-06-08 | End: 2019-07-02 | Stop reason: ALTCHOICE

## 2019-07-02 NOTE — PATIENT INSTRUCTIONS
Counseling and Referral of Other Preventative  (Italic type indicates deductible and co-insurance are waived)    Patient Name: Wild Galeano  Today's Date: 7/2/2019    Health Maintenance       Date Due Completion Date    Lipid Panel 1967 ---    Pneumococcal Vaccine (Medium Risk) (1 of 1 - PPSV23) 03/30/1986 ---    Shingles Vaccine (1 of 2) 03/30/2017 ---    Colonoscopy 03/30/2017 ---    Influenza Vaccine 08/01/2019 11/27/2017 (Declined)    Override on 11/27/2017: Declined    Override on 2/27/2017: Declined    TETANUS VACCINE 11/27/2027 11/27/2017 (Declined)    Override on 11/27/2017: Declined        No orders of the defined types were placed in this encounter.    The following information is provided to all patients.  This information is to help you find resources for any of the problems found today that may be affecting your health:                Living healthy guide: www.[facility].[place].[facility]      Understanding Diabetes: www.diabetes.org      Eating healthy: www.cdc.gov/healthyweight      CDC home safety checklist: www.cdc.gov/steadi/patient.html      Agency on Aging: www.goea.[place]      Alcoholics anonymous (AA): www.aa.org      Physical Activity: www.cary.nih.gov/ag9jepi      Tobacco use: www.quitwithusla.org

## 2019-07-02 NOTE — PROGRESS NOTES
"Wild Galeano presented for a  Medicare AWV and comprehensive Health Risk Assessment today. The following components were reviewed and updated:    · Medical history  · Family History  · Social history  · Allergies and Current Medications  · Health Risk Assessment  · Health Maintenance  · Care Team     ** See Completed Assessments for Annual Wellness Visit within the encounter summary.**     The following assessments were completed:  · Living Situation  · CAGE  · Depression Screening  · Timed Get Up and Go  · Whisper Test  · Cognitive Function Screening      · Nutrition Screening  · ADL Screening  · PAQ Screening    Vitals:    07/02/19 1105   BP: 138/80   BP Location: Left arm   Patient Position: Sitting   BP Method: Medium (Manual)   Pulse: 78   SpO2: 97%   Weight: 71.5 kg (157 lb 10.1 oz)   Height: 5' 7" (1.702 m)     Body mass index is 24.69 kg/m².  Physical Exam   Constitutional: He is oriented to person, place, and time. He appears well-nourished.   Cardiovascular: Normal rate, regular rhythm, normal heart sounds and intact distal pulses.   Pulmonary/Chest: Effort normal and breath sounds normal.   Neurological: He is alert and oriented to person, place, and time.   Skin: Skin is warm and dry.   Vitals reviewed.      Diagnoses and health risks identified today and associated recommendations/orders:    1. Encounter for preventive health examination  Reviewed and discussed health maintenance.    Labs today per PCP team order  - Fecal Immunochemical Test (iFOBT); Future  - Ambulatory referral to Neurosurgery    2. Screening for colon cancer  - Fecal Immunochemical Test (iFOBT); Future    3. Cervical disc disease with myelopathy  - Ambulatory referral to Neurosurgery    4. Chronic pain syndrome  - Ambulatory referral to Neurosurgery    5. Cervicogenic headache  - Ambulatory referral to Neurosurgery    6. Spinal stenosis of lumbar region with neurogenic claudication  - Ambulatory referral to Neurosurgery    7. " Smoker  Discussed smoking cessation. Pt not willing to quit at this time    Provided Wild with a 5-10 year written screening schedule and personal prevention plan. Recommendations were developed using the USPSTF age appropriate recommendations. Education, counseling, and referrals were provided as needed. After Visit Summary printed and given to patient which includes a list of additional screenings\tests needed.    Ivette Good NP

## 2019-07-03 ENCOUNTER — TELEPHONE (OUTPATIENT)
Dept: FAMILY MEDICINE | Facility: CLINIC | Age: 54
End: 2019-07-03

## 2019-07-03 NOTE — TELEPHONE ENCOUNTER
----- Message from Josias Wilder sent at 7/3/2019  2:54 PM CDT -----  Type:  Patient Returning Call    Who Called: self   Who Left Message for Patient:    Does the patient know what this is regarding?:  Best Call Back Number:  [phone]  Additional Information:

## 2019-07-03 NOTE — TELEPHONE ENCOUNTER
Message sent to [facility] in error.   Message sent to Petrona Tovar's staff~ pt is returning the nurse call from her office.

## 2019-07-08 ENCOUNTER — OFFICE VISIT (OUTPATIENT)
Dept: FAMILY MEDICINE | Facility: CLINIC | Age: 54
End: 2019-07-08
Payer: MEDICARE

## 2019-07-08 VITALS
SYSTOLIC BLOOD PRESSURE: 108 MMHG | BODY MASS INDEX: 25.15 KG/M2 | WEIGHT: 160.25 LBS | DIASTOLIC BLOOD PRESSURE: 72 MMHG | HEIGHT: 67 IN | HEART RATE: 84 BPM | OXYGEN SATURATION: 97 %

## 2019-07-08 DIAGNOSIS — L02.91 ABSCESS: Primary | ICD-10-CM

## 2019-07-08 DIAGNOSIS — M50.00 CERVICAL DISC DISEASE WITH MYELOPATHY: ICD-10-CM

## 2019-07-08 PROBLEM — F11.93 OPIOID WITHDRAWAL: Status: RESOLVED | Noted: 2017-11-28 | Resolved: 2019-07-08

## 2019-07-08 PROBLEM — F11.90 CHRONIC, CONTINUOUS USE OF OPIOIDS: Status: RESOLVED | Noted: 2017-11-28 | Resolved: 2019-07-08

## 2019-07-08 PROCEDURE — 99214 OFFICE O/P EST MOD 30 MIN: CPT | Mod: HCNC,S$GLB,, | Performed by: NURSE PRACTITIONER

## 2019-07-08 PROCEDURE — 3008F BODY MASS INDEX DOCD: CPT | Mod: HCNC,CPTII,S$GLB, | Performed by: NURSE PRACTITIONER

## 2019-07-08 PROCEDURE — 99999 PR PBB SHADOW E&M-EST. PATIENT-LVL III: ICD-10-PCS | Mod: PBBFAC,HCNC,, | Performed by: NURSE PRACTITIONER

## 2019-07-08 PROCEDURE — 99214 PR OFFICE/OUTPT VISIT, EST, LEVL IV, 30-39 MIN: ICD-10-PCS | Mod: HCNC,S$GLB,, | Performed by: NURSE PRACTITIONER

## 2019-07-08 PROCEDURE — 3008F PR BODY MASS INDEX (BMI) DOCUMENTED: ICD-10-PCS | Mod: HCNC,CPTII,S$GLB, | Performed by: NURSE PRACTITIONER

## 2019-07-08 PROCEDURE — 99999 PR PBB SHADOW E&M-EST. PATIENT-LVL III: CPT | Mod: PBBFAC,HCNC,, | Performed by: NURSE PRACTITIONER

## 2019-07-08 RX ORDER — DOXYCYCLINE HYCLATE 100 MG
100 TABLET ORAL EVERY 12 HOURS
Qty: 14 TABLET | Refills: 0 | Status: SHIPPED | OUTPATIENT
Start: 2019-07-08 | End: 2019-07-15

## 2019-07-08 NOTE — PROGRESS NOTES
Subjective:       Patient ID: Wild Galeano is a 54 y.o. male.    Chief Complaint: Annual Exam and bump on back    Mr. Galeano is a known patient to me. He presents today for checkup/lab review    HPI   He is doing SO MUCH BETTER since he was last seen by myself and PCP. Recall history of chronic pain 2/2 left hip and cervical spinal stenosis--was on heavy opioids. He is no longer on any pain medication--smoking marijuana which helps significantly. still with chronic pain but feels so much better off of opioids. Cheerful, looks great. He is now motivated to fix cervical issues and is scheduled to see Dr. Donato for further evaluation/treatment recommendations.     Reports painful bump to back between shoulder blades--wife tried to pick at it but no discharge. Noticed 2 weeks ago  Vitals:    07/08/19 1530   BP: 108/72   Pulse: 84     Review of Systems   Constitutional: Negative for diaphoresis and fever.   HENT: Negative for facial swelling and trouble swallowing.    Eyes: Negative for discharge and redness.   Respiratory: Negative for cough and shortness of breath.    Cardiovascular: Negative for chest pain and palpitations.   Gastrointestinal: Negative for abdominal pain and diarrhea.   Genitourinary: Negative for difficulty urinating.   Musculoskeletal: Positive for back pain and neck pain.   Skin: Negative for pallor and rash.   Neurological: Negative for facial asymmetry and speech difficulty.   Psychiatric/Behavioral: Negative for confusion. The patient is not nervous/anxious.        Past Medical History:   Diagnosis Date    Prediabetes     PTSD (post-traumatic stress disorder)     Smoker      Objective:      Physical Exam   Constitutional: He is oriented to person, place, and time. He does not have a sickly appearance. No distress.   HENT:   Head: Normocephalic.   Right Ear: Hearing normal.   Left Ear: Hearing normal.   Nose: Nose normal.   Eyes: Conjunctivae and lids are normal.   Neck: No JVD  present. No tracheal deviation present.   Cardiovascular: Normal rate and normal heart sounds.   Pulmonary/Chest: Effort normal and breath sounds normal.   Abdominal: Normal appearance. He exhibits no distension.   Musculoskeletal: He exhibits no deformity.        Cervical back: He exhibits pain.   Neurological: He is alert and oriented to person, place, and time.   Skin: He is not diaphoretic. No pallor.        Psychiatric: He has a normal mood and affect. His speech is normal and behavior is normal. Judgment and thought content normal. Cognition and memory are normal.   Nursing note and vitals reviewed.      Assessment:       1. Abscess    2. Cervical disc disease with myelopathy        Plan:       Abscess  -     doxycycline (VIBRA-TABS) 100 MG tablet; Take 1 tablet (100 mg total) by mouth every 12 (twelve) hours. for 7 days  Dispense: 14 tablet; Refill: 0    Cervical disc disease with myelopathy      Follow up in about 1 year (around 7/8/2020) for annual with PCP, and for initial consult with neurosurgery as scheduled.    Medication List with Changes/Refills   New Medications    DOXYCYCLINE (VIBRA-TABS) 100 MG TABLET    Take 1 tablet (100 mg total) by mouth every 12 (twelve) hours. for 7 days   Current Medications    CHLORHEXIDINE (HIBICLENS) 4 % EXTERNAL LIQUID    Apply topically daily as needed. Wash affected areas daily until lesions resolve.

## 2019-10-31 ENCOUNTER — PATIENT OUTREACH (OUTPATIENT)
Dept: ADMINISTRATIVE | Facility: HOSPITAL | Age: 54
End: 2019-10-31

## 2019-10-31 NOTE — PROGRESS NOTES
polymedco report wave 1, FITKIT mail out  Dear Bernard, [facility] is committed to your overall health and would like to ensure that you are up to date on all of your health maintenance testing.  Our records indicate that you are overdue for your colorectal cancer screening.  After reviewing your chart, it has been documented that a FIT KIT (colorectal cancer screening kit) was given at a clinic visit or  mailed to you,  but the lab has yet to receive the kit so that we may update your chart.   This is a friendly reminder to complete this test (the instructions will tell you how) and then return your sample in the postage-paid return envelope within 24 hours of collection.  Please remember to put the collection date on your sample!    If your test results are negative, you won't need testing again for another year.  If results show you need more testing, we will call you with next steps.    Sincerely,      Medhat Alejo MD and your [facility]

## 2019-10-31 NOTE — LETTER
October 31, 2019    Wild Galeano  [address] [address]             [facility]  [address]  [place] [address]  Phone: [phone] Dear Bernard, [facility] is committed to your overall health and would like to ensure that you are up to date on all of your health maintenance testing.  Our records indicate that you are overdue for your colorectal cancer screening.  After reviewing your chart, it has been documented that a FIT KIT (colorectal cancer screening kit) was given at a clinic visit or  mailed to you,  but the lab has yet to receive the kit so that we may update your chart.   This is a friendly reminder to complete this test (the instructions will tell you how) and then return your sample in the postage-paid return envelope within 24 hours of collection.  Please remember to put the collection date on your sample!    If your test results are negative, you won't need testing again for another year.  If results show you need more testing, we will call you with next steps.    Sincerely,      Medhat Alejo MD and your [facility]

## 2020-02-18 ENCOUNTER — TELEPHONE (OUTPATIENT)
Dept: FAMILY MEDICINE | Facility: CLINIC | Age: 55
End: 2020-02-18

## 2020-05-25 ENCOUNTER — PES CALL (OUTPATIENT)
Dept: ADMINISTRATIVE | Facility: CLINIC | Age: 55
End: 2020-05-25

## 2020-06-15 ENCOUNTER — OFFICE VISIT (OUTPATIENT)
Dept: FAMILY MEDICINE | Facility: CLINIC | Age: 55
End: 2020-06-15
Payer: MEDICARE

## 2020-06-15 VITALS
WEIGHT: 151.69 LBS | OXYGEN SATURATION: 98 % | HEART RATE: 57 BPM | SYSTOLIC BLOOD PRESSURE: 106 MMHG | HEIGHT: 67 IN | DIASTOLIC BLOOD PRESSURE: 70 MMHG | BODY MASS INDEX: 23.81 KG/M2

## 2020-06-15 DIAGNOSIS — Z00.00 ENCOUNTER FOR PREVENTIVE HEALTH EXAMINATION: Primary | ICD-10-CM

## 2020-06-15 DIAGNOSIS — F17.200 SMOKER: ICD-10-CM

## 2020-06-15 DIAGNOSIS — F12.20 MARIJUANA DEPENDENCE: ICD-10-CM

## 2020-06-15 DIAGNOSIS — R73.03 PREDIABETES: ICD-10-CM

## 2020-06-15 PROCEDURE — 99499 UNLISTED E&M SERVICE: CPT | Mod: S$GLB,,, | Performed by: NURSE PRACTITIONER

## 2020-06-15 PROCEDURE — 99499 RISK ADDL DX/OHS AUDIT: ICD-10-PCS | Mod: S$GLB,,, | Performed by: NURSE PRACTITIONER

## 2020-06-15 PROCEDURE — G0439 PPPS, SUBSEQ VISIT: HCPCS | Mod: HCNC,S$GLB,, | Performed by: NURSE PRACTITIONER

## 2020-06-15 PROCEDURE — 99999 PR PBB SHADOW E&M-EST. PATIENT-LVL III: ICD-10-PCS | Mod: PBBFAC,HCNC,, | Performed by: NURSE PRACTITIONER

## 2020-06-15 PROCEDURE — G0439 PR MEDICARE ANNUAL WELLNESS SUBSEQUENT VISIT: ICD-10-PCS | Mod: HCNC,S$GLB,, | Performed by: NURSE PRACTITIONER

## 2020-06-15 PROCEDURE — 99999 PR PBB SHADOW E&M-EST. PATIENT-LVL III: CPT | Mod: PBBFAC,HCNC,, | Performed by: NURSE PRACTITIONER

## 2020-06-15 NOTE — PROGRESS NOTES
"Wild Galeano presented for a  Medicare AWV and comprehensive Health Risk Assessment today. The following components were reviewed and updated:    · Medical history  · Family History  · Social history  · Allergies and Current Medications  · Health Risk Assessment  · Health Maintenance  · Care Team     ** See Completed Assessments for Annual Wellness Visit within the encounter summary.**       The following assessments were completed:  · Living Situation  · CAGE  · Depression Screening  · Timed Get Up and Go  · Whisper Test  · Cognitive Function Screening          · Nutrition Screening  · ADL Screening  · PAQ Screening    Vitals:    06/15/20 0701   BP: 106/70   BP Location: Left arm   Patient Position: Sitting   BP Method: Medium (Manual)   Pulse: (!) 57   SpO2: 98%   Weight: 68.8 kg (151 lb 10.8 oz)   Height: 5' 6.5" (1.689 m)     Body mass index is 24.11 kg/m².  Physical Exam  Vitals signs reviewed.   HENT:      Head: Normocephalic.   Cardiovascular:      Rate and Rhythm: Bradycardia present.   Pulmonary:      Effort: Pulmonary effort is normal.      Breath sounds: No wheezing.   Musculoskeletal:      Cervical back: He exhibits decreased range of motion.   Skin:     General: Skin is warm.   Neurological:      Gait: Gait abnormal.           Diagnoses and health risks identified today and associated recommendations/orders:    1. Encounter for preventive health examination  Reviewed health maintenance and provided recommendations   [facility]  Declines PPSV23 at this time  educated on PPSV23 and shingrix    2. Marijuana dependence  Continue to monitor  Followed by Medhat Alejo MD .      3. Smoker  Continue to monitor  Followed by Medhat Alejo MD   counseling provided.      4. Prediabetes  Continue to monitor  Followed by Medhat Alejo MD .        Provided Wild with a 5-10 year written screening schedule and personal prevention plan. Recommendations were developed using the USPSTF age appropriate " recommendations. Education, counseling, and referrals were provided as needed. After Visit Summary printed and given to patient which includes a list of additional screenings\tests needed.    Follow up in about 1 year (around 6/15/2021).    Jailene Lopez NP  I offered to discuss end of life issues, including information on how to make advance directives that the patient could use to name someone who would make medical decisions on their behalf if they became too ill to make themselves.    ___Patient declined  _X_Patient is interested, I provided paper work and offered to discuss.

## 2020-06-15 NOTE — PATIENT INSTRUCTIONS
Counseling and Referral of Other Preventative  (Italic type indicates deductible and co-insurance are waived)    Patient Name: Wild Galeano  Today's Date: 6/15/2020    Health Maintenance       Date Due Completion Date    HIV Screening 03/30/1982 ---    Pneumococcal Vaccine (Medium Risk) (1 of 1 - PPSV23) 03/30/1986 ---    Shingles Vaccine (1 of 2) 03/30/2017 ---    Colorectal Cancer Screening 03/30/2017 ---    Influenza Vaccine (Season Ended) 09/01/2020 ---    Lipid Panel 07/02/2024 7/2/2019    TETANUS VACCINE 11/27/2027 11/27/2017 (Declined)    Override on 11/27/2017: Declined        No orders of the defined types were placed in this encounter.    The following information is provided to all patients.  This information is to help you find resources for any of the problems found today that may be affecting your health:                Living healthy guide: www.[facility].[place].[facility]      Understanding Diabetes: www.diabetes.org      Eating healthy: www.cdc.gov/healthyweight      CDC home safety checklist: www.cdc.gov/steadi/patient.html      Agency on Aging: www.goea.[place].[facility]      Alcoholics anonymous (AA): www.aa.org      Physical Activity: www.cary.nih.gov/vp7cick      Tobacco use: www.quitwithusla.org

## 2021-03-01 ENCOUNTER — PATIENT OUTREACH (OUTPATIENT)
Dept: ADMINISTRATIVE | Facility: HOSPITAL | Age: 56
End: 2021-03-01

## 2021-03-25 ENCOUNTER — PES CALL (OUTPATIENT)
Dept: ADMINISTRATIVE | Facility: CLINIC | Age: 56
End: 2021-03-25

## 2021-03-26 ENCOUNTER — OFFICE VISIT (OUTPATIENT)
Dept: FAMILY MEDICINE | Facility: CLINIC | Age: 56
End: 2021-03-26
Payer: MEDICARE

## 2021-03-26 VITALS
SYSTOLIC BLOOD PRESSURE: 130 MMHG | HEART RATE: 64 BPM | BODY MASS INDEX: 25.78 KG/M2 | HEIGHT: 67 IN | OXYGEN SATURATION: 97 % | DIASTOLIC BLOOD PRESSURE: 88 MMHG | WEIGHT: 164.25 LBS

## 2021-03-26 DIAGNOSIS — Z00.00 ENCOUNTER FOR PREVENTIVE HEALTH EXAMINATION: Primary | ICD-10-CM

## 2021-03-26 DIAGNOSIS — F17.200 SMOKER: ICD-10-CM

## 2021-03-26 PROCEDURE — G0439 PPPS, SUBSEQ VISIT: HCPCS | Mod: S$GLB,,, | Performed by: NURSE PRACTITIONER

## 2021-03-26 PROCEDURE — 99999 PR PBB SHADOW E&M-EST. PATIENT-LVL III: CPT | Mod: PBBFAC,,, | Performed by: NURSE PRACTITIONER

## 2021-03-26 PROCEDURE — 3008F PR BODY MASS INDEX (BMI) DOCUMENTED: ICD-10-PCS | Mod: CPTII,S$GLB,, | Performed by: NURSE PRACTITIONER

## 2021-03-26 PROCEDURE — 3008F BODY MASS INDEX DOCD: CPT | Mod: CPTII,S$GLB,, | Performed by: NURSE PRACTITIONER

## 2021-03-26 PROCEDURE — G0439 PR MEDICARE ANNUAL WELLNESS SUBSEQUENT VISIT: ICD-10-PCS | Mod: S$GLB,,, | Performed by: NURSE PRACTITIONER

## 2021-03-26 PROCEDURE — 1125F AMNT PAIN NOTED PAIN PRSNT: CPT | Mod: S$GLB,,, | Performed by: NURSE PRACTITIONER

## 2021-03-26 PROCEDURE — 99999 PR PBB SHADOW E&M-EST. PATIENT-LVL III: ICD-10-PCS | Mod: PBBFAC,,, | Performed by: NURSE PRACTITIONER

## 2021-03-26 PROCEDURE — 1125F PR PAIN SEVERITY QUANTIFIED, PAIN PRESENT: ICD-10-PCS | Mod: S$GLB,,, | Performed by: NURSE PRACTITIONER

## 2021-03-26 RX ORDER — CYANOCOBALAMIN 1000 UG/ML
1000 INJECTION, SOLUTION INTRAMUSCULAR; SUBCUTANEOUS
COMMUNITY
Start: 2020-11-04 | End: 2024-01-24

## 2021-05-06 ENCOUNTER — TELEPHONE (OUTPATIENT)
Dept: FAMILY MEDICINE | Facility: CLINIC | Age: 56
End: 2021-05-06

## 2021-09-18 ENCOUNTER — PATIENT OUTREACH (OUTPATIENT)
Dept: ADMINISTRATIVE | Facility: HOSPITAL | Age: 56
End: 2021-09-18

## 2022-08-31 ENCOUNTER — TELEPHONE (OUTPATIENT)
Dept: FAMILY MEDICINE | Facility: CLINIC | Age: 57
End: 2022-08-31
Payer: MEDICARE

## 2022-11-21 ENCOUNTER — PES CALL (OUTPATIENT)
Dept: ADMINISTRATIVE | Facility: CLINIC | Age: 57
End: 2022-11-21
Payer: MEDICARE

## 2023-01-10 ENCOUNTER — PES CALL (OUTPATIENT)
Dept: ADMINISTRATIVE | Facility: CLINIC | Age: 58
End: 2023-01-10
Payer: MEDICARE

## 2023-04-27 PROBLEM — Z01.818 PREOP EXAMINATION: Status: ACTIVE | Noted: 2023-04-27

## 2023-04-27 PROBLEM — S14.129A CENTRAL CORD SYNDROME: Status: ACTIVE | Noted: 2023-04-27
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
## 2023-05-05 PROBLEM — E87.1 HYPONATREMIA: Status: ACTIVE | Noted: 2023-05-05

## 2023-05-05 PROBLEM — K59.00 CONSTIPATION: Status: ACTIVE | Noted: 2023-05-05

## 2023-05-09 ENCOUNTER — TELEPHONE (OUTPATIENT)
Dept: NEUROSURGERY | Facility: CLINIC | Age: 58
End: 2023-05-09
Payer: MEDICARE

## 2023-05-09 DIAGNOSIS — M50.00 CERVICAL DISC DISEASE WITH MYELOPATHY: Primary | ICD-10-CM

## 2023-05-09 NOTE — TELEPHONE ENCOUNTER
----- Message from Joshua Bah MD sent at 5/8/2023  8:45 PM CDT -----  Regarding: follow up  Please schedule this hospital patient for the appropriate post op follow-up: 2 weeks from surgery for staple removal, 4 weeks with cervical XR and 3 months with cervical XR.

## 2023-05-09 NOTE — TELEPHONE ENCOUNTER
Wound check and post op appointments with xray scheduled per availability. Patient will receive upon discharge from [facility].

## 2023-05-15 ENCOUNTER — HOSPITAL ENCOUNTER (OUTPATIENT)
Dept: RADIOLOGY | Facility: HOSPITAL | Age: 58
Discharge: HOME | End: 2023-05-15
Attending: PHYSICAL MEDICINE & REHABILITATION
Payer: MEDICARE

## 2023-05-15 PROCEDURE — 73030 X-RAY EXAM OF SHOULDER: CPT | Mod: 26,HCNC,RT, | Performed by: RADIOLOGY

## 2023-05-15 PROCEDURE — 73030 XR SHOULDER COMPLETE 2 OR MORE VIEWS RIGHT: ICD-10-PCS | Mod: 26,HCNC,RT, | Performed by: RADIOLOGY

## 2023-05-16 ENCOUNTER — HOSPITAL ENCOUNTER (OUTPATIENT)
Dept: RADIOLOGY | Facility: HOSPITAL | Age: 58
Discharge: HOME | End: 2023-05-16
Attending: PHYSICAL MEDICINE & REHABILITATION
Payer: MEDICARE

## 2023-05-16 PROCEDURE — 72125 CT NECK SPINE W/O DYE: CPT | Mod: 26,HCNC,, | Performed by: RADIOLOGY

## 2023-05-16 PROCEDURE — 72125 CT CERVICAL SPINE WITHOUT CONTRAST: ICD-10-PCS | Mod: 26,HCNC,, | Performed by: RADIOLOGY

## 2023-05-23 ENCOUNTER — TELEPHONE (OUTPATIENT)
Dept: NEUROSURGERY | Facility: CLINIC | Age: 58
End: 2023-05-23

## 2023-05-23 ENCOUNTER — CLINICAL SUPPORT (OUTPATIENT)
Dept: NEUROSURGERY | Facility: CLINIC | Age: 58
End: 2023-05-23
Payer: MEDICARE

## 2023-05-23 NOTE — PROGRESS NOTES
Wound Check- Incision is clean, dry and intact. Staples present, removed with no signs of infection. States some numbness left hand pointer finger and some numbness down his legs but nothing that he feels he needs to worry. Informed patient that those are normal and part of the healing process. States sleeping better and taking medication as prescribed.

## 2023-05-23 NOTE — TELEPHONE ENCOUNTER
----- Message from Nikolas Morataya sent at 5/23/2023  2:38 PM CDT -----  Regarding: wound care instructions  Type: Needs Medical Advice    Who Called:  wife // kellen Lew Call Back Number: [phone] and [phone] wife if dont get     Additional Information: pt was in office today for wound check. Wife needs to know how to handle rash that is around wound and wound cleaning.  Also has a couple of other questions about PT/OT. Please call to discuss.

## 2023-05-26 ENCOUNTER — TELEPHONE (OUTPATIENT)
Dept: FAMILY MEDICINE | Facility: CLINIC | Age: 58
End: 2023-05-26
Payer: MEDICARE

## 2023-05-26 NOTE — TELEPHONE ENCOUNTER
Pt called saying he thinks his hand is broken, pt fell and hurt neck which resulted in a surgery.    Now pt is complaining of hand pain, he cant move it and it hurts. Informed him we do not have any appointments and he needs to go to urgent care which can xray his hand and start a treatment plan.    Gave him directions to [facility] urgent [facility].     He wanted a follow up from surgery with . got him scheduled.

## 2023-05-26 NOTE — TELEPHONE ENCOUNTER
----- Message from Beatriz Nikolas sent at 5/26/2023 10:03 AM CDT -----  Contact: patient  Type:  Needs Medical Advice    Who Called:  patient     Would the patient rather a call back or a response via [facility]? Call     Best Call Back Number: [phone] (home)      Additional Information: Patient would like to speak with the nurse in regards to his hand. Patient stated that he thinks his hand is broke.     Please call to advise

## 2023-06-05 RX ORDER — OXYCODONE AND ACETAMINOPHEN 7.5; 325 MG/1; MG/1
1 TABLET ORAL EVERY 6 HOURS PRN
Qty: 28 TABLET | Refills: 0 | Status: SHIPPED | OUTPATIENT
Start: 2023-06-05 | End: 2023-06-12 | Stop reason: SDUPTHER

## 2023-06-06 ENCOUNTER — OFFICE VISIT (OUTPATIENT)
Dept: FAMILY MEDICINE | Facility: CLINIC | Age: 58
End: 2023-06-06
Payer: MEDICARE

## 2023-06-06 VITALS
HEART RATE: 88 BPM | WEIGHT: 154.75 LBS | OXYGEN SATURATION: 98 % | BODY MASS INDEX: 24.87 KG/M2 | DIASTOLIC BLOOD PRESSURE: 70 MMHG | SYSTOLIC BLOOD PRESSURE: 122 MMHG | HEIGHT: 66 IN

## 2023-06-06 DIAGNOSIS — Z00.00 ROUTINE PHYSICAL EXAMINATION: Primary | ICD-10-CM

## 2023-06-06 DIAGNOSIS — Z87.891 PERSONAL HISTORY OF NICOTINE DEPENDENCE: ICD-10-CM

## 2023-06-06 DIAGNOSIS — Z12.5 ENCOUNTER FOR SCREENING FOR MALIGNANT NEOPLASM OF PROSTATE: ICD-10-CM

## 2023-06-06 DIAGNOSIS — F12.20 MARIJUANA DEPENDENCE: ICD-10-CM

## 2023-06-06 DIAGNOSIS — Z12.2 ENCOUNTER FOR SCREENING FOR LUNG CANCER: ICD-10-CM

## 2023-06-06 DIAGNOSIS — S14.129D CENTRAL CORD SYNDROME, SUBSEQUENT ENCOUNTER: ICD-10-CM

## 2023-06-06 DIAGNOSIS — Z12.11 SCREEN FOR COLON CANCER: ICD-10-CM

## 2023-06-06 DIAGNOSIS — I10 ESSENTIAL HYPERTENSION: ICD-10-CM

## 2023-06-06 PROCEDURE — 99999 PR PBB SHADOW E&M-EST. PATIENT-LVL IV: ICD-10-PCS | Mod: PBBFAC,,, | Performed by: INTERNAL MEDICINE

## 2023-06-06 PROCEDURE — 99214 PR OFFICE/OUTPT VISIT, EST, LEVL IV, 30-39 MIN: ICD-10-PCS | Mod: 25,S$GLB,, | Performed by: INTERNAL MEDICINE

## 2023-06-06 PROCEDURE — 3074F PR MOST RECENT SYSTOLIC BLOOD PRESSURE < 130 MM HG: ICD-10-PCS | Mod: CPTII,S$GLB,, | Performed by: INTERNAL MEDICINE

## 2023-06-06 PROCEDURE — 1159F PR MEDICATION LIST DOCUMENTED IN MEDICAL RECORD: ICD-10-PCS | Mod: CPTII,S$GLB,, | Performed by: INTERNAL MEDICINE

## 2023-06-06 PROCEDURE — 3078F DIAST BP <80 MM HG: CPT | Mod: CPTII,S$GLB,, | Performed by: INTERNAL MEDICINE

## 2023-06-06 PROCEDURE — 3044F HG A1C LEVEL LT 7.0%: CPT | Mod: CPTII,S$GLB,, | Performed by: INTERNAL MEDICINE

## 2023-06-06 PROCEDURE — 1111F DSCHRG MED/CURRENT MED MERGE: CPT | Mod: CPTII,S$GLB,, | Performed by: INTERNAL MEDICINE

## 2023-06-06 PROCEDURE — 3008F PR BODY MASS INDEX (BMI) DOCUMENTED: ICD-10-PCS | Mod: CPTII,S$GLB,, | Performed by: INTERNAL MEDICINE

## 2023-06-06 PROCEDURE — 3078F PR MOST RECENT DIASTOLIC BLOOD PRESSURE < 80 MM HG: ICD-10-PCS | Mod: CPTII,S$GLB,, | Performed by: INTERNAL MEDICINE

## 2023-06-06 PROCEDURE — 1159F MED LIST DOCD IN RCRD: CPT | Mod: CPTII,S$GLB,, | Performed by: INTERNAL MEDICINE

## 2023-06-06 PROCEDURE — 1111F PR DISCHARGE MEDS RECONCILED W/ CURRENT OUTPATIENT MED LIST: ICD-10-PCS | Mod: CPTII,S$GLB,, | Performed by: INTERNAL MEDICINE

## 2023-06-06 PROCEDURE — 99214 OFFICE O/P EST MOD 30 MIN: CPT | Mod: 25,S$GLB,, | Performed by: INTERNAL MEDICINE

## 2023-06-06 PROCEDURE — 99999 PR PBB SHADOW E&M-EST. PATIENT-LVL IV: CPT | Mod: PBBFAC,,, | Performed by: INTERNAL MEDICINE

## 2023-06-06 PROCEDURE — 3008F BODY MASS INDEX DOCD: CPT | Mod: CPTII,S$GLB,, | Performed by: INTERNAL MEDICINE

## 2023-06-06 PROCEDURE — 99396 PREV VISIT EST AGE 40-64: CPT | Mod: S$GLB,,, | Performed by: INTERNAL MEDICINE

## 2023-06-06 PROCEDURE — 1160F PR REVIEW ALL MEDS BY PRESCRIBER/CLIN PHARMACIST DOCUMENTED: ICD-10-PCS | Mod: CPTII,S$GLB,, | Performed by: INTERNAL MEDICINE

## 2023-06-06 PROCEDURE — 3074F SYST BP LT 130 MM HG: CPT | Mod: CPTII,S$GLB,, | Performed by: INTERNAL MEDICINE

## 2023-06-06 PROCEDURE — 99396 PR PREVENTIVE VISIT,EST,40-64: ICD-10-PCS | Mod: S$GLB,,, | Performed by: INTERNAL MEDICINE

## 2023-06-06 PROCEDURE — 3044F PR MOST RECENT HEMOGLOBIN A1C LEVEL <7.0%: ICD-10-PCS | Mod: CPTII,S$GLB,, | Performed by: INTERNAL MEDICINE

## 2023-06-06 PROCEDURE — 1160F RVW MEDS BY RX/DR IN RCRD: CPT | Mod: CPTII,S$GLB,, | Performed by: INTERNAL MEDICINE

## 2023-06-06 NOTE — PROGRESS NOTES
Subjective:       Patient ID: Wild Galeano is a 58 y.o. male.  Chief Complaint: Post-op Problem     HPI    Here for routine health maintenance.    [facility]      Refuses vaccines         HTN - controlled    5/3 had cervical surgery - fusion and kate.  F/u apt  6/23   Has right hand and arm pain since.  Went to [facility] for evaluation.  Work up normal.  They explained would take good 6 weeks for inflammation to go down.    Requesting handicap tag    Smoked > 1 PPD for 20 yr.  (44 yr 1-2 ppd).  DC 10/2022.    Assessment:       1. Routine physical examination    2. Marijuana dependence    3. Central cord syndrome, subsequent encounter    4. Essential hypertension    5. Encounter for screening for lung cancer    6. Screen for colon cancer    7. Personal history of nicotine dependence    8. Encounter for screening for malignant neoplasm of prostate        Plan:       Routine physical examination    Marijuana dependence    Central cord syndrome, subsequent encounter    Essential hypertension    Encounter for screening for lung cancer  -     CT Chest Lung Screening Low Dose; Future; Expected date: 06/06/2023    Screen for colon cancer  -     Case Request Endoscopy: COLONOSCOPY    Personal history of nicotine dependence  -     CT Chest Lung Screening Low Dose; Future; Expected date: 06/06/2023    Encounter for screening for malignant neoplasm of prostate              Wellness reviewed  [facility] of [facility] and [facility]p        Handicap tag form filled   Continue current management and monitor.  Other diagnoses were reviewed and found stable and will continue to monitor.  Counseled on regular exercise, maintenance of a healthy weight, balanced diet rich in fruits/vegetables and lean protein, and avoidance of unhealthy habits like smoking and excessive alcohol intake.   Also, counseled on importance of being compliant with medication, health appointments, diet and exercise.     Follow up in about 1 year (around  6/6/2024).  Okay to alternate [facility] and me every other year if only wants to check in once a year here since [facility].        Medication List with Changes/Refills   Current Medications    AMLODIPINE (NORVASC) 5 MG TABLET    Take 1 tablet (5 mg total) by mouth in the morning for 90 days.    CYANOCOBALAMIN 1,000 MCG/ML INJECTION    Inject 1,000 mcg into the muscle every 30 days.    CYCLOBENZAPRINE (FLEXERIL) 5 MG TABLET    Take 1 tablet (5 mg total) by mouth 3 (three) times a day as needed for muscle spasms for up to 7 days.    DOCUSATE SODIUM (COLACE) 100 MG CAPSULE    Take 2 capsules (200 mg total) by mouth once daily.    GABAPENTIN (NEURONTIN) 600 MG TABLET    Take 1 tablet (600 mg total) by mouth 3 (three) times a day for 90 days.    OMEPRAZOLE (PRILOSEC) 20 MG CAPSULE    20 mg once daily.    OXYCODONE-ACETAMINOPHEN (PERCOCET) 7.5-325 MG PER TABLET    Take 1 tablet by mouth every 6 (six) hours as needed for Pain.    POLYETHYLENE GLYCOL (GLYCOLAX) 17 GRAM PWPK    Take 17 g by mouth 2 (two) times daily as needed (constipation).    SILDENAFIL (VIAGRA) 100 MG TABLET    Take 100 mg by mouth as needed for Erectile Dysfunction.    TRAZODONE (DESYREL) 100 MG TABLET    Take 1 tablet (100 mg total) by mouth nightly for 60 days.   Discontinued Medications    AMLODIPINE (NORVASC) 5 MG TABLET    Take 1 tablet (5 mg total) by mouth once daily.       BP Readings from Last 3 Encounters:   06/06/23 122/70   05/10/23 (!) 137/100   03/26/21 130/88     Hemoglobin A1C   Date Value Ref Range Status   04/27/2023 5.7 (H) 0.0 - 5.6 % Final     Comment:     Reference Interval:  5.0 - 5.6 Normal   5.7 - 6.4 High Risk   > 6.5 Diabetic      Hgb A1c results are standardized based on the (NGSP) National   Glycohemoglobin Standardization Program.      Hemoglobin A1C levels are related to mean serum/plasma glucose   during the preceding 2-3 months.        05/10/2017 5.9 4.5 - 6.2 % Final     Comment:     According to ADA guidelines,  hemoglobin A1C <7.0% represents  optimal control in non-pregnant diabetic patients.  Different  metrics may apply to specific populations.   Standards of Medical Care in Diabetes - 2016.  For the purpose of screening for the presence of diabetes:  <5.7%     Consistent with the absence of diabetes  5.7-6.4%  Consistent with increasing risk for diabetes   (prediabetes)  >or=6.5%  Consistent with diabetes  Currently no consensus exists for use of hemoglobin A1C  for diagnosis of diabetes for children.       Lab Results   Component Value Date    TSH 1.437 07/02/2019     Lab Results   Component Value Date    LDLCALC 128.4 07/02/2019     Lab Results   Component Value Date    TRIG 113 07/02/2019     Wt Readings from Last 3 Encounters:   06/06/23 70.2 kg (154 lb 12.2 oz)   05/04/23 71.5 kg (157 lb 10.1 oz)   03/26/21 74.5 kg (164 lb 3.9 oz)     Lab Results   Component Value Date    HGB 12.4 (L) 05/09/2023    HCT 38.2 (L) 05/09/2023    WBC 20.13 (H) 05/09/2023    ALT 34 05/05/2023    AST 37 05/05/2023     (L) 05/09/2023    K 4.5 05/09/2023    CREATININE 0.67 05/09/2023    PSA 0.57 07/02/2019           Review of Systems        Objective:      Vitals:    06/06/23 1410   BP: 122/70   Pulse:      Physical Exam  Vitals reviewed.   Eyes:      Conjunctiva/sclera: Conjunctivae normal.   Neck:      Thyroid: No thyromegaly.      Trachea: Trachea normal.   Cardiovascular:      Rate and Rhythm: Normal rate and regular rhythm.      Comments: Edema negative  Pulmonary:      Effort: Pulmonary effort is normal.      Breath sounds: Normal breath sounds.   Abdominal:      General: Bowel sounds are normal.      Palpations: Abdomen is soft. There is no hepatomegaly.   Musculoskeletal:      Cervical back: Normal range of motion.      Comments: Decreased ROM right arm    Skin:     General: Skin is warm and dry.   Neurological:      Deep Tendon Reflexes: Reflexes are normal and symmetric.   Psychiatric:      Comments: Alert and Oriented

## 2023-06-12 ENCOUNTER — TELEPHONE (OUTPATIENT)
Dept: NEUROSURGERY | Facility: CLINIC | Age: 58
End: 2023-06-12
Payer: MEDICARE

## 2023-06-12 DIAGNOSIS — M50.00 CERVICAL DISC DISEASE WITH MYELOPATHY: ICD-10-CM

## 2023-06-12 DIAGNOSIS — Z98.1 S/P CERVICAL SPINAL FUSION: Primary | ICD-10-CM

## 2023-06-12 RX ORDER — OXYCODONE AND ACETAMINOPHEN 7.5; 325 MG/1; MG/1
1 TABLET ORAL EVERY 6 HOURS PRN
Qty: 28 TABLET | Refills: 0 | Status: SHIPPED | OUTPATIENT
Start: 2023-06-12 | End: 2023-09-24

## 2023-06-12 NOTE — TELEPHONE ENCOUNTER
----- Message from Rosie Foster PA-C sent at 6/12/2023 12:19 PM CDT -----  Will refill his medications. Please let patient know that this is his last refill from us as he is almost 6 weeks post op and we will refer him to pain management for any further refills.    Rosie  ----- Message -----  From: Melonie Hernandez LPN  Sent: 6/12/2023   9:53 AM CDT  To: Rosie Foster PA-C    Would like to get a refill on medication. Had surgery in May, please let me know either way so I can call the patient. Thanks

## 2023-06-13 ENCOUNTER — OFFICE VISIT (OUTPATIENT)
Dept: NEUROSURGERY | Facility: CLINIC | Age: 58
End: 2023-06-13
Payer: MEDICARE

## 2023-06-13 VITALS
WEIGHT: 154.75 LBS | HEART RATE: 105 BPM | DIASTOLIC BLOOD PRESSURE: 82 MMHG | BODY MASS INDEX: 24.87 KG/M2 | SYSTOLIC BLOOD PRESSURE: 105 MMHG | RESPIRATION RATE: 18 BRPM | HEIGHT: 66 IN

## 2023-06-13 DIAGNOSIS — Z98.1 S/P CERVICAL SPINAL FUSION: Primary | ICD-10-CM

## 2023-06-13 DIAGNOSIS — M50.00 CERVICAL DISC DISEASE WITH MYELOPATHY: ICD-10-CM

## 2023-06-13 PROCEDURE — 99024 POSTOP FOLLOW-UP VISIT: CPT | Mod: S$GLB,,, | Performed by: NEUROLOGICAL SURGERY

## 2023-06-13 PROCEDURE — 3008F PR BODY MASS INDEX (BMI) DOCUMENTED: ICD-10-PCS | Mod: CPTII,S$GLB,, | Performed by: NEUROLOGICAL SURGERY

## 2023-06-13 PROCEDURE — 3074F SYST BP LT 130 MM HG: CPT | Mod: CPTII,S$GLB,, | Performed by: NEUROLOGICAL SURGERY

## 2023-06-13 PROCEDURE — 99024 PR POST-OP FOLLOW-UP VISIT: ICD-10-PCS | Mod: S$GLB,,, | Performed by: NEUROLOGICAL SURGERY

## 2023-06-13 PROCEDURE — 1159F MED LIST DOCD IN RCRD: CPT | Mod: CPTII,S$GLB,, | Performed by: NEUROLOGICAL SURGERY

## 2023-06-13 PROCEDURE — 3008F BODY MASS INDEX DOCD: CPT | Mod: CPTII,S$GLB,, | Performed by: NEUROLOGICAL SURGERY

## 2023-06-13 PROCEDURE — 1159F PR MEDICATION LIST DOCUMENTED IN MEDICAL RECORD: ICD-10-PCS | Mod: CPTII,S$GLB,, | Performed by: NEUROLOGICAL SURGERY

## 2023-06-13 PROCEDURE — 3074F PR MOST RECENT SYSTOLIC BLOOD PRESSURE < 130 MM HG: ICD-10-PCS | Mod: CPTII,S$GLB,, | Performed by: NEUROLOGICAL SURGERY

## 2023-06-13 PROCEDURE — 3079F PR MOST RECENT DIASTOLIC BLOOD PRESSURE 80-89 MM HG: ICD-10-PCS | Mod: CPTII,S$GLB,, | Performed by: NEUROLOGICAL SURGERY

## 2023-06-13 PROCEDURE — 3079F DIAST BP 80-89 MM HG: CPT | Mod: CPTII,S$GLB,, | Performed by: NEUROLOGICAL SURGERY

## 2023-06-13 PROCEDURE — 3044F HG A1C LEVEL LT 7.0%: CPT | Mod: CPTII,S$GLB,, | Performed by: NEUROLOGICAL SURGERY

## 2023-06-13 PROCEDURE — 3044F PR MOST RECENT HEMOGLOBIN A1C LEVEL <7.0%: ICD-10-PCS | Mod: CPTII,S$GLB,, | Performed by: NEUROLOGICAL SURGERY

## 2023-06-13 NOTE — PROGRESS NOTES
Neurosurgery History and Physical    Patient ID: Wild Galeano is a 58 y.o. male.    Chief Complaint   Patient presents with    Post-op Evaluation     4 week POV, posterior cervical fusion.  Reports R arm/hand numbness, tingling and weakness since surgery.  Unable to lift R arm.  Loss of  in R hand.      Patient presents today for a post op follow up. He is 4 weeks post posterior fusion of C2-7. The right index finger feels stiff and swollen to him. He is noticing some weakness in the right arm as well. His motion and pain has improved since surgery overall. He has been compliant in his Cervical collar. He reports a cramp down the left side of the neck. He didn't notice any particular weakness in the right arm since leaving the hospital.     Review of Systems   Musculoskeletal:  Positive for neck pain.   Neurological:  Positive for weakness and numbness.     Past Medical History:   Diagnosis Date    Cervical spinal stenosis     Prediabetes     PTSD (post-traumatic stress disorder)     Smoker      Social History     Socioeconomic History    Marital status:    Tobacco Use    Smoking status: Every Day     Packs/day: 2.00     Years: 36.00     Pack years: 72.00     Types: Cigarettes    Smokeless tobacco: Never   Substance and Sexual Activity    Alcohol use: No    Drug use: Yes     Types: Marijuana    Sexual activity: Never     Family History   Problem Relation Age of Onset    Alcohol abuse Mother     Drug abuse Father      Review of patient's allergies indicates:  No Known Allergies    Current Outpatient Medications:     amLODIPine (NORVASC) 5 MG tablet, Take 1 tablet (5 mg total) by mouth in the morning for 90 days., Disp: 90 tablet, Rfl: 0    cyanocobalamin 1,000 mcg/mL injection, Inject 1,000 mcg into the muscle every 30 days., Disp: , Rfl:     cyclobenzaprine (FLEXERIL) 5 MG tablet, Take 1 tablet (5 mg total) by mouth 3 (three) times a day as needed for muscle spasms for up to 7 days., Disp: 21  "tablet, Rfl: 0    docusate sodium (COLACE) 100 MG capsule, Take 2 capsules (200 mg total) by mouth once daily., Disp: , Rfl: 0    gabapentin (NEURONTIN) 600 MG tablet, Take 1 tablet (600 mg total) by mouth 3 (three) times a day for 90 days., Disp: 270 tablet, Rfl: 0    omeprazole (PRILOSEC) 20 MG capsule, 20 mg once daily., Disp: , Rfl:     oxyCODONE-acetaminophen (PERCOCET) 7.5-325 mg per tablet, Take 1 tablet by mouth every 6 (six) hours as needed for Pain., Disp: 28 tablet, Rfl: 0    polyethylene glycol (GLYCOLAX) 17 gram PwPk, Take 17 g by mouth 2 (two) times daily as needed (constipation)., Disp: , Rfl: 0    sildenafiL (VIAGRA) 100 MG tablet, Take 100 mg by mouth as needed for Erectile Dysfunction., Disp: , Rfl:     traZODone (DESYREL) 100 MG tablet, Take 1 tablet (100 mg total) by mouth nightly for 60 days., Disp: 60 tablet, Rfl: 0  Blood pressure 105/82, pulse 105, resp. rate 18, height 5' 6" (1.676 m), weight 70.2 kg (154 lb 12.2 oz).      Neurologic Exam     Mental Status   Oriented to person, place, and time.   Attention: normal. Concentration: normal.   Speech: speech is normal   Level of consciousness: alert  Knowledge: good.   Normal comprehension.     Cranial Nerves     CN VII   Facial expression full, symmetric.     Motor Exam   Right arm tone: decreased    Strength   Right deltoid: 4/5  Left deltoid: 5/5  Right biceps: 4/5  Left biceps: 5/5  Right triceps: 4/5  Left triceps: 5/5  Right wrist flexion: 4/5  Left wrist flexion: 5/5  Right wrist extension: 4/5  Left wrist extension: 5/5  Right interossei: 4/5  Left interossei: 5/5  Right iliopsoas: 5/5  Left iliopsoas: 5/5  Right quadriceps: 5/5  Left quadriceps: 5/5  Right hamstrin/5  Left hamstrin/5  Right anterior tibial: 5/5  Left anterior tibial: 5/5  Right peroneal: 5/5  Left peroneal: 5/5  Right gastroc: 5/5  Left gastroc: 5/5  Difficulty going from sitting to standing due to hip pain.     4- biceps R  4+ triceps R  4+ deltoid right       " "    Sensory Exam   Left arm light touch: normal  Right leg light touch: normal  Left leg light touch: normal  Numbness of BLE has resolved.     C6 mildly decreased sensation on right.       Gait, Coordination, and Reflexes     Reflexes   Right brachioradialis: 0  Left brachioradialis: 2+  Right biceps: 0  Left biceps: 2+  Right triceps: 1+  Left triceps: 2+  Right patellar: 2+  Left patellar: 2+  Right achilles: 2+  Left achilles: 2+  Right Jordan: absent  Left Jordan: absent  Right ankle clonus: present (2 beats)  Left ankle clonus: present (2 beats)    Physical Exam  Musculoskeletal:      Comments: Posterior cervical incision well healing without active bleeding, drainage, or surrounding erythema.    Neurological:      Mental Status: He is oriented to person, place, and time.      Deep Tendon Reflexes:      Reflex Scores:       Tricep reflexes are 1+ on the right side and 2+ on the left side.       Bicep reflexes are 0 on the right side and 2+ on the left side.       Brachioradialis reflexes are 0 on the right side and 2+ on the left side.       Patellar reflexes are 2+ on the right side and 2+ on the left side.       Achilles reflexes are 2+ on the right side and 2+ on the left side.     Comments:   Impaired rapid hand movements.       Psychiatric:         Speech: Speech normal.       Vital Signs  Pulse: 105  Resp: 18  BP: 105/82  BP Location: Left arm  Patient Position: Sitting  Pain Score:   6  Pain Loc: Neck  Height and Weight  Height: 5' 6" (167.6 cm)  Weight: 70.2 kg (154 lb 12.2 oz)  BSA (Calculated - sq m): 1.81 sq meters  BMI (Calculated): 25  Weight in (lb) to have BMI = 25: 154.6]    Provider dictation:  I reviewed the imaging.  XR cervical spine performed 6/13/23 reveals hardware in appropriate placement.   CT cervical spine without contrast dated 5/16/23 reveals intact hardware.     Visit Diagnosis:  S/P cervical spinal fusion    Cervical disc disease with myelopathy      4 weeks status post " posterior fusion C2-7  -Discussed pain medication use and referral to pain management for additional refills; avoid NSAIDs but okay to take Tylenol OTC. Patient plans on weaning self off of narcotic pain medications.   -Remain in cervical collar for 2 additional weeks.  -May drive at that time if comfortable and not taking pain medications.   -Return to clinic for 3 month post op follow up with XR.

## 2023-06-16 ENCOUNTER — TELEPHONE (OUTPATIENT)
Dept: GASTROENTEROLOGY | Facility: CLINIC | Age: 58
End: 2023-06-16
Payer: MEDICARE

## 2023-07-03 ENCOUNTER — HOSPITAL ENCOUNTER (OUTPATIENT)
Dept: RADIOLOGY | Facility: HOSPITAL | Age: 58
Discharge: HOME | End: 2023-07-03
Attending: INTERNAL MEDICINE
Payer: MEDICARE

## 2023-07-03 DIAGNOSIS — Z87.891 PERSONAL HISTORY OF NICOTINE DEPENDENCE: ICD-10-CM

## 2023-07-03 DIAGNOSIS — Z12.2 ENCOUNTER FOR SCREENING FOR LUNG CANCER: ICD-10-CM

## 2023-07-03 PROCEDURE — 71271 CT CHEST LUNG SCREENING LOW DOSE: ICD-10-PCS | Mod: 26,HCNC,, | Performed by: RADIOLOGY

## 2023-07-03 PROCEDURE — 71271 CT THORAX LUNG CANCER SCR C-: CPT | Mod: 26,HCNC,, | Performed by: RADIOLOGY

## 2023-07-03 PROCEDURE — 71271 CT THORAX LUNG CANCER SCR C-: CPT | Mod: TC,HCNC,PO

## 2023-07-12 ENCOUNTER — OFFICE VISIT (OUTPATIENT)
Dept: FAMILY MEDICINE | Facility: CLINIC | Age: 58
End: 2023-07-12
Payer: MEDICARE

## 2023-07-12 ENCOUNTER — HOSPITAL ENCOUNTER (OUTPATIENT)
Dept: RADIOLOGY | Facility: HOSPITAL | Age: 58
Discharge: HOME | End: 2023-07-12
Attending: NURSE PRACTITIONER
Payer: MEDICARE

## 2023-07-12 VITALS
TEMPERATURE: 98 F | HEART RATE: 98 BPM | DIASTOLIC BLOOD PRESSURE: 86 MMHG | BODY MASS INDEX: 25.54 KG/M2 | OXYGEN SATURATION: 96 % | WEIGHT: 158.94 LBS | SYSTOLIC BLOOD PRESSURE: 126 MMHG | HEIGHT: 66 IN

## 2023-07-12 DIAGNOSIS — M25.551 RIGHT HIP PAIN: Primary | ICD-10-CM

## 2023-07-12 DIAGNOSIS — M16.11 OSTEOARTHRITIS OF RIGHT HIP, UNSPECIFIED OSTEOARTHRITIS TYPE: ICD-10-CM

## 2023-07-12 DIAGNOSIS — Z98.1 S/P CERVICAL SPINAL FUSION: ICD-10-CM

## 2023-07-12 DIAGNOSIS — M25.551 RIGHT HIP PAIN: ICD-10-CM

## 2023-07-12 PROCEDURE — 99999 PR PBB SHADOW E&M-EST. PATIENT-LVL IV: CPT | Mod: PBBFAC,HCNC,, | Performed by: NURSE PRACTITIONER

## 2023-07-12 PROCEDURE — 99999 PR PBB SHADOW E&M-EST. PATIENT-LVL IV: ICD-10-PCS | Mod: PBBFAC,HCNC,, | Performed by: NURSE PRACTITIONER

## 2023-07-12 PROCEDURE — 3074F SYST BP LT 130 MM HG: CPT | Mod: HCNC,CPTII,S$GLB, | Performed by: NURSE PRACTITIONER

## 2023-07-12 PROCEDURE — 73502 XR HIP WITH PELVIS WHEN PERFORMED, 2 OR 3  VIEWS RIGHT: ICD-10-PCS | Mod: 26,HCNC,RT, | Performed by: RADIOLOGY

## 2023-07-12 PROCEDURE — 99214 OFFICE O/P EST MOD 30 MIN: CPT | Mod: HCNC,S$GLB,, | Performed by: NURSE PRACTITIONER

## 2023-07-12 PROCEDURE — 3008F BODY MASS INDEX DOCD: CPT | Mod: HCNC,CPTII,S$GLB, | Performed by: NURSE PRACTITIONER

## 2023-07-12 PROCEDURE — 3044F PR MOST RECENT HEMOGLOBIN A1C LEVEL <7.0%: ICD-10-PCS | Mod: HCNC,CPTII,S$GLB, | Performed by: NURSE PRACTITIONER

## 2023-07-12 PROCEDURE — 99214 PR OFFICE/OUTPT VISIT, EST, LEVL IV, 30-39 MIN: ICD-10-PCS | Mod: HCNC,S$GLB,, | Performed by: NURSE PRACTITIONER

## 2023-07-12 PROCEDURE — 3044F HG A1C LEVEL LT 7.0%: CPT | Mod: HCNC,CPTII,S$GLB, | Performed by: NURSE PRACTITIONER

## 2023-07-12 PROCEDURE — 1159F MED LIST DOCD IN RCRD: CPT | Mod: HCNC,CPTII,S$GLB, | Performed by: NURSE PRACTITIONER

## 2023-07-12 PROCEDURE — 3079F PR MOST RECENT DIASTOLIC BLOOD PRESSURE 80-89 MM HG: ICD-10-PCS | Mod: HCNC,CPTII,S$GLB, | Performed by: NURSE PRACTITIONER

## 2023-07-12 PROCEDURE — 3008F PR BODY MASS INDEX (BMI) DOCUMENTED: ICD-10-PCS | Mod: HCNC,CPTII,S$GLB, | Performed by: NURSE PRACTITIONER

## 2023-07-12 PROCEDURE — 3079F DIAST BP 80-89 MM HG: CPT | Mod: HCNC,CPTII,S$GLB, | Performed by: NURSE PRACTITIONER

## 2023-07-12 PROCEDURE — 1159F PR MEDICATION LIST DOCUMENTED IN MEDICAL RECORD: ICD-10-PCS | Mod: HCNC,CPTII,S$GLB, | Performed by: NURSE PRACTITIONER

## 2023-07-12 PROCEDURE — 3074F PR MOST RECENT SYSTOLIC BLOOD PRESSURE < 130 MM HG: ICD-10-PCS | Mod: HCNC,CPTII,S$GLB, | Performed by: NURSE PRACTITIONER

## 2023-07-12 PROCEDURE — 73502 X-RAY EXAM HIP UNI 2-3 VIEWS: CPT | Mod: 26,HCNC,RT, | Performed by: RADIOLOGY

## 2023-07-12 PROCEDURE — 73502 X-RAY EXAM HIP UNI 2-3 VIEWS: CPT | Mod: TC,HCNC,FY,PO,RT

## 2023-07-12 NOTE — PROGRESS NOTES
Subjective:       Patient ID: Wild Galeano is a 58 y.o. male.    Chief Complaint: Hip Pain (Pt has been having right hip pain.  Pt had left hip replacement in 2017.)    Hip Pain   There was no injury mechanism. The pain is present in the right hip. The quality of the pain is described as aching. Associated symptoms include a loss of motion. The symptoms are aggravated by movement and weight bearing. He has tried acetaminophen for the symptoms. The treatment provided mild relief.     Xray LEFT hip 2017 IMPRESSION:    Degenerative changes are noted bilaterally with significant hip joint space loss on the left in comparison to the right along with what could relate to avascular necrosis. MRI may be of use    S/p left hip replacement Dr Dennis March 2017 --did well       Hx tobacco abuse--quit 10/2022. Recent LDCT negative  S/p cervical fusion 5/3/23--doing well, in PT    Vitals:    07/12/23 0810   BP: 126/86   Pulse: 98   Temp: 97.8 °F (36.6 °C)     Review of Systems   Musculoskeletal:  Positive for arthralgias.   Neurological:  Positive for weakness.     Past Medical History:   Diagnosis Date    Cervical spinal stenosis     Prediabetes     PTSD (post-traumatic stress disorder)     Smoker      Objective:      Physical Exam  Constitutional:       General: He is not in acute distress.     Appearance: He is well-developed. He is not ill-appearing, toxic-appearing or diaphoretic.   HENT:      Right Ear: Hearing normal.      Left Ear: Hearing normal.   Pulmonary:      Effort: No tachypnea or respiratory distress.   Skin:     Coloration: Skin is not pale.   Neurological:      Mental Status: He is alert and oriented to person, place, and time.   Psychiatric:         Speech: Speech normal.         Behavior: Behavior normal.         Thought Content: Thought content normal.         Judgment: Judgment normal.       Assessment:       1. Right hip pain    2. Osteoarthritis of right hip, unspecified osteoarthritis type    3.  S/P cervical spinal fusion        Plan:       Right hip pain  -     X-Ray Hip 2 or 3 views Right (with Pelvis when performed); Future; Expected date: 07/12/2023  -     Ambulatory referral/consult to Orthopedics; Future; Expected date: 07/19/2023    Osteoarthritis of right hip, unspecified osteoarthritis type  -     X-Ray Hip 2 or 3 views Right (with Pelvis when performed); Future; Expected date: 07/12/2023  -     Ambulatory referral/consult to Orthopedics; Future; Expected date: 07/19/2023    S/P cervical spinal fusion          Ortho eval, FU routinely with PCP       Medication List with Changes/Refills   Current Medications    AMLODIPINE (NORVASC) 5 MG TABLET    Take 1 tablet (5 mg total) by mouth in the morning for 90 days.    CYANOCOBALAMIN 1,000 MCG/ML INJECTION    Inject 1,000 mcg into the muscle every 30 days.    CYCLOBENZAPRINE (FLEXERIL) 5 MG TABLET    Take 1 tablet (5 mg total) by mouth 3 (three) times a day as needed for muscle spasms for up to 7 days.    DOCUSATE SODIUM (COLACE) 100 MG CAPSULE    Take 2 capsules (200 mg total) by mouth once daily.    GABAPENTIN (NEURONTIN) 600 MG TABLET    Take 1 tablet (600 mg total) by mouth 3 (three) times a day for 90 days.    OMEPRAZOLE (PRILOSEC) 20 MG CAPSULE    20 mg once daily.    OXYCODONE-ACETAMINOPHEN (PERCOCET) 7.5-325 MG PER TABLET    Take 1 tablet by mouth every 6 (six) hours as needed for Pain.    POLYETHYLENE GLYCOL (GLYCOLAX) 17 GRAM PWPK    Take 17 g by mouth 2 (two) times daily as needed (constipation).    SILDENAFIL (VIAGRA) 100 MG TABLET    Take 100 mg by mouth as needed for Erectile Dysfunction.    TRAZODONE (DESYREL) 100 MG TABLET    Take 1 tablet (100 mg total) by mouth nightly for 60 days.

## 2023-07-26 ENCOUNTER — OFFICE VISIT (OUTPATIENT)
Dept: ORTHOPEDICS | Facility: CLINIC | Age: 58
End: 2023-07-26
Payer: MEDICARE

## 2023-07-26 DIAGNOSIS — M25.551 RIGHT HIP PAIN: ICD-10-CM

## 2023-07-26 DIAGNOSIS — M16.11 OSTEOARTHRITIS OF RIGHT HIP, UNSPECIFIED OSTEOARTHRITIS TYPE: Primary | ICD-10-CM

## 2023-07-26 DIAGNOSIS — M16.11 OSTEOARTHRITIS OF RIGHT HIP, UNSPECIFIED OSTEOARTHRITIS TYPE: ICD-10-CM

## 2023-07-26 PROCEDURE — 1160F RVW MEDS BY RX/DR IN RCRD: CPT | Mod: HCNC,CPTII,S$GLB, | Performed by: ORTHOPAEDIC SURGERY

## 2023-07-26 PROCEDURE — 99204 PR OFFICE/OUTPT VISIT, NEW, LEVL IV, 45-59 MIN: ICD-10-PCS | Mod: HCNC,S$GLB,, | Performed by: ORTHOPAEDIC SURGERY

## 2023-07-26 PROCEDURE — 3044F PR MOST RECENT HEMOGLOBIN A1C LEVEL <7.0%: ICD-10-PCS | Mod: HCNC,CPTII,S$GLB, | Performed by: ORTHOPAEDIC SURGERY

## 2023-07-26 PROCEDURE — 99204 OFFICE O/P NEW MOD 45 MIN: CPT | Mod: HCNC,S$GLB,, | Performed by: ORTHOPAEDIC SURGERY

## 2023-07-26 PROCEDURE — 99999 PR PBB SHADOW E&M-EST. PATIENT-LVL II: ICD-10-PCS | Mod: PBBFAC,HCNC,, | Performed by: ORTHOPAEDIC SURGERY

## 2023-07-26 PROCEDURE — 1159F MED LIST DOCD IN RCRD: CPT | Mod: HCNC,CPTII,S$GLB, | Performed by: ORTHOPAEDIC SURGERY

## 2023-07-26 PROCEDURE — 1159F PR MEDICATION LIST DOCUMENTED IN MEDICAL RECORD: ICD-10-PCS | Mod: HCNC,CPTII,S$GLB, | Performed by: ORTHOPAEDIC SURGERY

## 2023-07-26 PROCEDURE — 1160F PR REVIEW ALL MEDS BY PRESCRIBER/CLIN PHARMACIST DOCUMENTED: ICD-10-PCS | Mod: HCNC,CPTII,S$GLB, | Performed by: ORTHOPAEDIC SURGERY

## 2023-07-26 PROCEDURE — 99999 PR PBB SHADOW E&M-EST. PATIENT-LVL II: CPT | Mod: PBBFAC,HCNC,, | Performed by: ORTHOPAEDIC SURGERY

## 2023-07-26 PROCEDURE — 3044F HG A1C LEVEL LT 7.0%: CPT | Mod: HCNC,CPTII,S$GLB, | Performed by: ORTHOPAEDIC SURGERY

## 2023-07-31 ENCOUNTER — TELEPHONE (OUTPATIENT)
Dept: GASTROENTEROLOGY | Facility: CLINIC | Age: 58
End: 2023-07-31
Payer: MEDICARE

## 2023-07-31 NOTE — TELEPHONE ENCOUNTER
Colonoscopy is scheduled on 8/14 with Dr. Peña at [address] [facility] [address] in [place]. After our Pre-service team gets the green light from your insurance, the Preop Nurse will call a couple of days before your procedure date with the arrival time.    Prep instructions has been sent via [facility] and via mail. Address is confirmed. Patient is informed the preop Nurse will call him/her with the arrival time a day or two prior to procedure. Patient verbalizes understanding.

## 2023-07-31 NOTE — TELEPHONE ENCOUNTER
----- Message from Jenni Tanner LPN sent at 7/31/2023  3:08 PM CDT -----    ----- Message -----  From: Clyde Torres  Sent: 7/31/2023   3:07 PM CDT  To: [facility] Clinical Staff    Type:  Patient Returning Call    Who Called:  Patient  Who Left Message for Patient:  Sandy  Does the patient know what this is regarding?:  Colonoscopy   Best Call Back Number: [phone]  Additional Information:

## 2023-07-31 NOTE — TELEPHONE ENCOUNTER
Second attempt to schedule C-scope. Pt doesn't answer phone call nor answer [employer] msg. I leave pt a brief vm to call back to schedule C-scope. Cancellation letter is sent to pt's current address. Case is closed.

## 2023-08-04 ENCOUNTER — HOSPITAL ENCOUNTER (OUTPATIENT)
Dept: RADIOLOGY | Facility: HOSPITAL | Age: 58
Discharge: HOME | End: 2023-08-04
Attending: ORTHOPAEDIC SURGERY
Payer: MEDICARE

## 2023-08-04 DIAGNOSIS — M25.551 RIGHT HIP PAIN: ICD-10-CM

## 2023-08-04 PROCEDURE — 25500020 PHARM REV CODE 255: Mod: HCNC,PO | Performed by: ORTHOPAEDIC SURGERY

## 2023-08-04 PROCEDURE — 20610 DRAIN/INJ JOINT/BURSA W/O US: CPT | Mod: HCNC,RT,, | Performed by: RADIOLOGY

## 2023-08-04 PROCEDURE — 20610 IR ASPIRATION INJECTION LARGE JOINT W FLUORO: ICD-10-PCS | Mod: HCNC,RT,, | Performed by: RADIOLOGY

## 2023-08-04 PROCEDURE — 63600175 PHARM REV CODE 636 W HCPCS: Mod: HCNC,PO | Performed by: ORTHOPAEDIC SURGERY

## 2023-08-04 PROCEDURE — 77002 NEEDLE LOCALIZATION BY XRAY: CPT | Mod: 26,HCNC,RT, | Performed by: RADIOLOGY

## 2023-08-04 PROCEDURE — 77002 IR ASPIRATION INJECTION LARGE JOINT W FLUORO: ICD-10-PCS | Mod: 26,HCNC,RT, | Performed by: RADIOLOGY

## 2023-08-04 PROCEDURE — 20610 DRAIN/INJ JOINT/BURSA W/O US: CPT | Mod: HCNC,PO

## 2023-08-04 RX ORDER — BUPIVACAINE HYDROCHLORIDE 2.5 MG/ML
10 INJECTION, SOLUTION EPIDURAL; INFILTRATION; INTRACAUDAL ONCE
Status: COMPLETED | OUTPATIENT
Start: 2023-08-04 | End: 2023-08-04

## 2023-08-04 RX ORDER — TRIAMCINOLONE ACETONIDE 40 MG/ML
40 INJECTION, SUSPENSION INTRA-ARTICULAR; INTRAMUSCULAR ONCE
Status: DISCONTINUED | OUTPATIENT
Start: 2023-08-04 | End: 2023-08-05 | Stop reason: HOSPADM

## 2023-08-04 RX ORDER — TRIAMCINOLONE ACETONIDE 40 MG/ML
40 INJECTION, SUSPENSION INTRA-ARTICULAR; INTRAMUSCULAR ONCE
Status: DISCONTINUED | OUTPATIENT
Start: 2023-08-04 | End: 2023-08-04

## 2023-08-04 RX ORDER — BUPIVACAINE HYDROCHLORIDE 2.5 MG/ML
10 INJECTION, SOLUTION INFILTRATION; PERINEURAL ONCE
Status: DISCONTINUED | OUTPATIENT
Start: 2023-08-04 | End: 2023-08-05 | Stop reason: HOSPADM

## 2023-08-04 RX ADMIN — BUPIVACAINE HYDROCHLORIDE 10 ML: 2.5 INJECTION, SOLUTION EPIDURAL; INFILTRATION; INTRACAUDAL; PERINEURAL at 10:08

## 2023-08-04 RX ADMIN — IOHEXOL 240 ML: 240 INJECTION, SOLUTION INTRATHECAL; INTRAVASCULAR; INTRAVENOUS; ORAL at 10:08

## 2023-08-04 RX ADMIN — TRIAMCINOLONE ACETONIDE 10 MG: 10 INJECTION, SUSPENSION INTRA-ARTICULAR; INTRALESIONAL at 10:08

## 2023-08-07 ENCOUNTER — OFFICE VISIT (OUTPATIENT)
Dept: NEUROSURGERY | Facility: CLINIC | Age: 58
End: 2023-08-07
Payer: MEDICARE

## 2023-08-07 ENCOUNTER — HOSPITAL ENCOUNTER (OUTPATIENT)
Dept: RADIOLOGY | Facility: HOSPITAL | Age: 58
Discharge: HOME | End: 2023-08-07
Attending: NEUROLOGICAL SURGERY
Payer: MEDICARE

## 2023-08-07 VITALS
WEIGHT: 158 LBS | HEIGHT: 66 IN | BODY MASS INDEX: 25.39 KG/M2 | DIASTOLIC BLOOD PRESSURE: 88 MMHG | RESPIRATION RATE: 18 BRPM | SYSTOLIC BLOOD PRESSURE: 136 MMHG | HEART RATE: 60 BPM

## 2023-08-07 DIAGNOSIS — M50.00 CERVICAL DISC DISEASE WITH MYELOPATHY: ICD-10-CM

## 2023-08-07 DIAGNOSIS — Z98.1 S/P CERVICAL SPINAL FUSION: Primary | ICD-10-CM

## 2023-08-07 PROCEDURE — 3044F HG A1C LEVEL LT 7.0%: CPT | Mod: HCNC,CPTII,S$GLB, | Performed by: NEUROLOGICAL SURGERY

## 2023-08-07 PROCEDURE — 99214 OFFICE O/P EST MOD 30 MIN: CPT | Mod: HCNC,S$GLB,, | Performed by: NEUROLOGICAL SURGERY

## 2023-08-07 PROCEDURE — 3044F PR MOST RECENT HEMOGLOBIN A1C LEVEL <7.0%: ICD-10-PCS | Mod: HCNC,CPTII,S$GLB, | Performed by: NEUROLOGICAL SURGERY

## 2023-08-07 PROCEDURE — 3075F PR MOST RECENT SYSTOLIC BLOOD PRESS GE 130-139MM HG: ICD-10-PCS | Mod: HCNC,CPTII,S$GLB, | Performed by: NEUROLOGICAL SURGERY

## 2023-08-07 PROCEDURE — 3079F DIAST BP 80-89 MM HG: CPT | Mod: HCNC,CPTII,S$GLB, | Performed by: NEUROLOGICAL SURGERY

## 2023-08-07 PROCEDURE — 72040 X-RAY EXAM NECK SPINE 2-3 VW: CPT | Mod: 26,HCNC,, | Performed by: RADIOLOGY

## 2023-08-07 PROCEDURE — 72040 X-RAY EXAM NECK SPINE 2-3 VW: CPT | Mod: TC,HCNC,FY,PO

## 2023-08-07 PROCEDURE — 99214 PR OFFICE/OUTPT VISIT, EST, LEVL IV, 30-39 MIN: ICD-10-PCS | Mod: HCNC,S$GLB,, | Performed by: NEUROLOGICAL SURGERY

## 2023-08-07 PROCEDURE — 72040 XR CERVICAL SPINE AP LATERAL: ICD-10-PCS | Mod: 26,HCNC,, | Performed by: RADIOLOGY

## 2023-08-07 PROCEDURE — 3008F PR BODY MASS INDEX (BMI) DOCUMENTED: ICD-10-PCS | Mod: HCNC,CPTII,S$GLB, | Performed by: NEUROLOGICAL SURGERY

## 2023-08-07 PROCEDURE — 1159F PR MEDICATION LIST DOCUMENTED IN MEDICAL RECORD: ICD-10-PCS | Mod: HCNC,CPTII,S$GLB, | Performed by: NEUROLOGICAL SURGERY

## 2023-08-07 PROCEDURE — 3008F BODY MASS INDEX DOCD: CPT | Mod: HCNC,CPTII,S$GLB, | Performed by: NEUROLOGICAL SURGERY

## 2023-08-07 PROCEDURE — 3075F SYST BP GE 130 - 139MM HG: CPT | Mod: HCNC,CPTII,S$GLB, | Performed by: NEUROLOGICAL SURGERY

## 2023-08-07 PROCEDURE — 3079F PR MOST RECENT DIASTOLIC BLOOD PRESSURE 80-89 MM HG: ICD-10-PCS | Mod: HCNC,CPTII,S$GLB, | Performed by: NEUROLOGICAL SURGERY

## 2023-08-07 PROCEDURE — 1159F MED LIST DOCD IN RCRD: CPT | Mod: HCNC,CPTII,S$GLB, | Performed by: NEUROLOGICAL SURGERY

## 2023-08-07 NOTE — PROGRESS NOTES
"Neurosurgery History and Physical    Patient ID: Wild Galeano is a 58 y.o. male.    Chief Complaint   Patient presents with    Post-op Evaluation     Patient present to clinic today as 3 month POV with XR. States of neck stiffness. "Popping" sound with motion. Incision site tender      Review of Systems   Musculoskeletal:  Positive for neck pain.   Neurological:  Positive for weakness and numbness.       Past Medical History:   Diagnosis Date    Cervical spinal stenosis     Prediabetes     PTSD (post-traumatic stress disorder)     Smoker      Social History     Socioeconomic History    Marital status:    Tobacco Use    Smoking status: Every Day     Current packs/day: 2.00     Average packs/day: 2.0 packs/day for 36.0 years (72.0 ttl pk-yrs)     Types: Cigarettes    Smokeless tobacco: Never   Substance and Sexual Activity    Alcohol use: No    Drug use: Yes     Types: Marijuana    Sexual activity: Never     Family History   Problem Relation Age of Onset    Alcohol abuse Mother     Drug abuse Father      Review of patient's allergies indicates:  No Known Allergies    Current Outpatient Medications:     amLODIPine (NORVASC) 5 MG tablet, Take 1 tablet (5 mg total) by mouth in the morning for 90 days., Disp: 90 tablet, Rfl: 0    bisacodyL (DULCOLAX) 5 mg EC tablet, Take as directed, Disp: 2 tablet, Rfl: 0    cyanocobalamin 1,000 mcg/mL injection, Inject 1,000 mcg into the muscle every 30 days., Disp: , Rfl:     cyclobenzaprine (FLEXERIL) 5 MG tablet, Take 1 tablet (5 mg total) by mouth 3 (three) times a day as needed for muscle spasms for up to 7 days., Disp: 21 tablet, Rfl: 0    docusate sodium (COLACE) 100 MG capsule, Take 2 capsules (200 mg total) by mouth once daily., Disp: , Rfl: 0    gabapentin (NEURONTIN) 600 MG tablet, Take 1 tablet (600 mg total) by mouth 3 (three) times a day for 90 days., Disp: 270 tablet, Rfl: 0    omeprazole (PRILOSEC) 20 MG capsule, 20 mg once daily., Disp: , Rfl:     " "oxyCODONE-acetaminophen (PERCOCET) 7.5-325 mg per tablet, Take 1 tablet by mouth every 6 (six) hours as needed for Pain., Disp: 28 tablet, Rfl: 0    polyethylene glycol (GLYCOLAX) 17 gram PwPk, Take 17 g by mouth 2 (two) times daily as needed (constipation)., Disp: , Rfl: 0    polyethylene glycol (GOLYTELY) 236-22.74-6.74 -5.86 gram suspension, Take as directed, Disp: 4000 mL, Rfl: 0    sildenafiL (VIAGRA) 100 MG tablet, Take 100 mg by mouth as needed for Erectile Dysfunction., Disp: , Rfl:     traZODone (DESYREL) 100 MG tablet, Take 1 tablet (100 mg total) by mouth nightly for 60 days., Disp: 60 tablet, Rfl: 0  Blood pressure 136/88, pulse 60, resp. rate 18, height 5' 6" (1.676 m), weight 71.7 kg (158 lb).      Neurologic Exam     Mental Status   Oriented to person, place, and time.   Attention: normal. Concentration: normal.   Speech: speech is normal   Level of consciousness: alert  Knowledge: good.   Normal comprehension.     Cranial Nerves     CN VII   Facial expression full, symmetric.     Motor Exam     Strength   Right deltoid: 5/5  Left deltoid: 5/5  Right biceps: 5/5  Left biceps: 5/5  Right triceps: 5/5  Left triceps: 5/5  Right wrist flexion: 5/5  Left wrist flexion: 5/5  Right wrist extension: 5/5  Left wrist extension: 5/5  Right interossei: 5/5  Left interossei: 5/5  Right iliopsoas: 5/5  Left iliopsoas: 5/5  Right quadriceps: 5/5  Left quadriceps: 5/5  Right anterior tibial: 5/5  Left anterior tibial: 5/5  Right peroneal: 5/5  Left peroneal: 5/5  Right gastroc: 5/5  Left gastroc: 5/5         Sensory Exam   Right arm light touch: normal  Left arm light touch: normal  Right leg light touch: normal  Left leg light touch: normal    Gait, Coordination, and Reflexes     Reflexes   Right brachioradialis: 2+  Left brachioradialis: 2+  Right biceps: 2+  Left biceps: 2+  Right triceps: 2+  Left triceps: 2+  Right patellar: 2+  Left patellar: 2+  Right achilles: 2+  Left achilles: 2+  Right Jordan: " "present  Left Jordan: present  Right ankle clonus: present (3 beats)  Left ankle clonus: present (3 beats)      Physical Exam  Musculoskeletal:      Comments: Posterior cervical incision well healing without active bleeding, drainage, or surrounding erythema.    Neurological:      Mental Status: He is oriented to person, place, and time.      Deep Tendon Reflexes:      Reflex Scores:       Tricep reflexes are 2+ on the right side and 2+ on the left side.       Bicep reflexes are 2+ on the right side and 2+ on the left side.       Brachioradialis reflexes are 2+ on the right side and 2+ on the left side.       Patellar reflexes are 2+ on the right side and 2+ on the left side.       Achilles reflexes are 2+ on the right side and 2+ on the left side.     Comments:      Psychiatric:         Speech: Speech normal.         Vital Signs  Pulse: 60  Resp: 18  BP: 136/88  BP Location: Right arm  Patient Position: Sitting  Pain Score:   5  Pain Loc: Neck  Height and Weight  Height: 5' 6" (167.6 cm)  Weight: 71.7 kg (158 lb)  BSA (Calculated - sq m): 1.83 sq meters  BMI (Calculated): 25.5  Weight in (lb) to have BMI = 25: 154.6]    Provider dictation:  Personally interpreted x-ray shows stable hardware placement and spinal alignment.    The patient states that he has axial neck pain for which he has been taking Tylenol and ibuprofen.  He states that the right C6 distribution numbness and weakness which he experienced at his last visit his almost completely resolved.  He is participating in physical therapy with good results.  He has been struggling with unrelated right hip pain which is being managed by Orthopedics.  He continues to feel dramatically better neurologically than prior surgery.  He tried stopping Neurontin but had some recurrence of hand paresthesias so he resumed it.    Now full strength on exam with resolution of his right upper extremity numbness.  He has return of a right biceps reflex.  Mild bilateral " Dolly signs but no other significant pathologic reflexes.  His incision is well healed.  There is prominence of his T1 spinous process which the patient states has been the source of some discomfort.  There is adequate soft tissue thickness over the bony prominence and no evidence of wound complications.    Excellent neurologic recovery.  His new right upper extremity signs and symptoms have almost fully resolved spontaneously.  I will refer him to pain management for his ongoing axial neck pain.  The patient understands that he had severe myelomalacia before surgery and that this means that his spinal cord function may never fully go back to normal.    Follow-up in 3 months with CT to assess the fusion.  I reminded him that NSAIDs can interfere with bone growth and the patient acknowledged we have previously told him this and that he would stop taking them.      Visit Diagnosis:  S/P cervical spinal fusion  -     CT Cervical Spine Without Contrast; Future; Expected date: 08/07/2023

## 2023-08-10 ENCOUNTER — TELEPHONE (OUTPATIENT)
Dept: NEUROSURGERY | Facility: CLINIC | Age: 58
End: 2023-08-10
Payer: MEDICARE

## 2023-08-10 ENCOUNTER — TELEPHONE (OUTPATIENT)
Dept: GASTROENTEROLOGY | Facility: CLINIC | Age: 58
End: 2023-08-10
Payer: MEDICARE

## 2023-08-10 NOTE — TELEPHONE ENCOUNTER
----- Message from Lore Diego sent at 8/10/2023 10:23 AM CDT -----  Type:  Needs Medical Advice    Who Called: pt   Best Call Back Number: [phone] (home)     Additional Information:  Requesting call back , pt requesting a referral to pain management   please advise thank you

## 2023-08-10 NOTE — TELEPHONE ENCOUNTER
----- Message from Katerina Carter RN sent at 8/10/2023 10:35 AM CDT -----    ----- Message -----  From: Lore Diego  Sent: 8/10/2023  10:25 AM CDT  To: Mariana Spaulding Staff    Type:  Patient Returning Call    Who Called:pt     Who Left Message for Patient:unk    Does the patient know what this is regarding?:yes     Would the patient rather a call back or a response via [facility]? Call     Best Call Back Number:[phone] (home)     Additional Information:  please advise thank you

## 2023-08-11 ENCOUNTER — OFFICE VISIT (OUTPATIENT)
Dept: PAIN MEDICINE | Facility: CLINIC | Age: 58
End: 2023-08-11
Payer: MEDICARE

## 2023-08-11 VITALS
WEIGHT: 159.31 LBS | DIASTOLIC BLOOD PRESSURE: 95 MMHG | SYSTOLIC BLOOD PRESSURE: 137 MMHG | HEART RATE: 78 BPM | HEIGHT: 66 IN | BODY MASS INDEX: 25.6 KG/M2

## 2023-08-11 DIAGNOSIS — Z98.1 S/P CERVICAL SPINAL FUSION: Primary | ICD-10-CM

## 2023-08-11 DIAGNOSIS — M50.00 CERVICAL DISC DISEASE WITH MYELOPATHY: ICD-10-CM

## 2023-08-11 PROCEDURE — 3044F HG A1C LEVEL LT 7.0%: CPT | Mod: HCNC,CPTII,S$GLB, | Performed by: PHYSICIAN ASSISTANT

## 2023-08-11 PROCEDURE — 99999 PR PBB SHADOW E&M-EST. PATIENT-LVL III: CPT | Mod: PBBFAC,HCNC,, | Performed by: PHYSICIAN ASSISTANT

## 2023-08-11 PROCEDURE — 1159F MED LIST DOCD IN RCRD: CPT | Mod: HCNC,CPTII,S$GLB, | Performed by: PHYSICIAN ASSISTANT

## 2023-08-11 PROCEDURE — 99204 PR OFFICE/OUTPT VISIT, NEW, LEVL IV, 45-59 MIN: ICD-10-PCS | Mod: HCNC,S$GLB,, | Performed by: PHYSICIAN ASSISTANT

## 2023-08-11 PROCEDURE — 3075F SYST BP GE 130 - 139MM HG: CPT | Mod: HCNC,CPTII,S$GLB, | Performed by: PHYSICIAN ASSISTANT

## 2023-08-11 PROCEDURE — 1160F PR REVIEW ALL MEDS BY PRESCRIBER/CLIN PHARMACIST DOCUMENTED: ICD-10-PCS | Mod: HCNC,CPTII,S$GLB, | Performed by: PHYSICIAN ASSISTANT

## 2023-08-11 PROCEDURE — 3008F BODY MASS INDEX DOCD: CPT | Mod: HCNC,CPTII,S$GLB, | Performed by: PHYSICIAN ASSISTANT

## 2023-08-11 PROCEDURE — 3080F PR MOST RECENT DIASTOLIC BLOOD PRESSURE >= 90 MM HG: ICD-10-PCS | Mod: HCNC,CPTII,S$GLB, | Performed by: PHYSICIAN ASSISTANT

## 2023-08-11 PROCEDURE — 99204 OFFICE O/P NEW MOD 45 MIN: CPT | Mod: HCNC,S$GLB,, | Performed by: PHYSICIAN ASSISTANT

## 2023-08-11 PROCEDURE — 3008F PR BODY MASS INDEX (BMI) DOCUMENTED: ICD-10-PCS | Mod: HCNC,CPTII,S$GLB, | Performed by: PHYSICIAN ASSISTANT

## 2023-08-11 PROCEDURE — 3075F PR MOST RECENT SYSTOLIC BLOOD PRESS GE 130-139MM HG: ICD-10-PCS | Mod: HCNC,CPTII,S$GLB, | Performed by: PHYSICIAN ASSISTANT

## 2023-08-11 PROCEDURE — 3044F PR MOST RECENT HEMOGLOBIN A1C LEVEL <7.0%: ICD-10-PCS | Mod: HCNC,CPTII,S$GLB, | Performed by: PHYSICIAN ASSISTANT

## 2023-08-11 PROCEDURE — 1160F RVW MEDS BY RX/DR IN RCRD: CPT | Mod: HCNC,CPTII,S$GLB, | Performed by: PHYSICIAN ASSISTANT

## 2023-08-11 PROCEDURE — 99999 PR PBB SHADOW E&M-EST. PATIENT-LVL III: ICD-10-PCS | Mod: PBBFAC,HCNC,, | Performed by: PHYSICIAN ASSISTANT

## 2023-08-11 PROCEDURE — 3080F DIAST BP >= 90 MM HG: CPT | Mod: HCNC,CPTII,S$GLB, | Performed by: PHYSICIAN ASSISTANT

## 2023-08-11 PROCEDURE — 1159F PR MEDICATION LIST DOCUMENTED IN MEDICAL RECORD: ICD-10-PCS | Mod: HCNC,CPTII,S$GLB, | Performed by: PHYSICIAN ASSISTANT

## 2023-08-11 RX ORDER — CYCLOBENZAPRINE HCL 10 MG
10 TABLET ORAL EVERY 12 HOURS PRN
Qty: 40 TABLET | Refills: 0 | Status: SHIPPED | OUTPATIENT
Start: 2023-08-11 | End: 2024-01-24

## 2023-08-11 NOTE — PROGRESS NOTES
[facility] New Patient Evaluation      Referred by: Rosie Foster    PCP: Medhat Alejo MD    CC:   Chief Complaint   Patient presents with    Neck Pain    Hip Pain     Right side.          HPI:   Wild Galeano is a 58 y.o. year old male patient who has a past medical history of Cervical spinal stenosis, General anesthetics causing adverse effect in therapeutic use, Prediabetes, PTSD (post-traumatic stress disorder), and Smoker. He presents in referral from Rosie Foster for neck pain.  He underwent 5-3-23 C2-C7 fusion for myelopathy by Dr. Bah.  He has seen improvement in right C6 distribution numbness and weakness.  He continues to have axail neck pain, radiating to the bilateral arms and hands.  He has foot pain.  He takes tylenol, ibuprofen (recommended to stop as NSAIDs delay bone healing from fusion).  He takes gabapentin and has tried weaning down on it, but pain increased.  He is taking gabapentin one to two times per day.  He is going to PT 2 times per week with care PT in [place].      Denies bowel/ bladder incontinence.    Past and current medications:  Antineuropathics:  gabapentin 600 mg 1-2 times per week  NSAIDs: (ibuprofen in past)  Antidepressants:  Muscle relaxers:  none  Opioids:    Antiplatelets/Anticoagulants:  Ohters:  tylenol    Physical Therapy/ Chiropractic care:  PT - undergoing    Pain Intervention History:  none    Past Spine Surgical History:  none        History:    Current Outpatient Medications:     cyanocobalamin 1,000 mcg/mL injection, Inject 1,000 mcg into the muscle every 30 days., Disp: , Rfl:     gabapentin (NEURONTIN) 600 MG tablet, Take 1 tablet (600 mg total) by mouth 3 (three) times a day for 90 days., Disp: 270 tablet, Rfl: 0    omeprazole (PRILOSEC) 20 MG capsule, 20 mg once daily., Disp: , Rfl:     sildenafiL (VIAGRA) 100 MG tablet, Take 100 mg by mouth as needed for Erectile Dysfunction., Disp: , Rfl:     amLODIPine (NORVASC) 5 MG tablet,  Take 1 tablet (5 mg total) by mouth in the morning for 90 days. (Patient not taking: Reported on 8/11/2023), Disp: 90 tablet, Rfl: 0    bisacodyL (DULCOLAX) 5 mg EC tablet, Take as directed (Patient not taking: Reported on 8/11/2023), Disp: 2 tablet, Rfl: 0    cyclobenzaprine (FLEXERIL) 10 MG tablet, Take 1 tablet (10 mg total) by mouth every 12 (twelve) hours as needed for muscle spasms/pain., Disp: 40 tablet, Rfl: 0    docusate sodium (COLACE) 100 MG capsule, Take 2 capsules (200 mg total) by mouth once daily. (Patient not taking: Reported on 8/11/2023), Disp: , Rfl: 0    oxyCODONE-acetaminophen (PERCOCET) 7.5-325 mg per tablet, Take 1 tablet by mouth every 6 (six) hours as needed for Pain. (Patient not taking: Reported on 8/11/2023), Disp: 28 tablet, Rfl: 0    polyethylene glycol (GLYCOLAX) 17 gram PwPk, Take 17 g by mouth 2 (two) times daily as needed (constipation). (Patient not taking: Reported on 8/11/2023), Disp: , Rfl: 0    polyethylene glycol (GOLYTELY) 236-22.74-6.74 -5.86 gram suspension, Take as directed (Patient not taking: Reported on 8/11/2023), Disp: 4000 mL, Rfl: 0    traZODone (DESYREL) 100 MG tablet, Take 1 tablet (100 mg total) by mouth nightly for 60 days. (Patient not taking: Reported on 8/11/2023), Disp: 60 tablet, Rfl: 0    Past Medical History:   Diagnosis Date    Cervical spinal stenosis     General anesthetics causing adverse effect in therapeutic use     Prediabetes     PTSD (post-traumatic stress disorder)     Smoker        Past Surgical History:   Procedure Laterality Date    JOINT REPLACEMENT Left 03/2017    MA[facility]I    POSTERIOR FUSION OF CERVICAL SPINE WITH LAMINECTOMY N/A 5/3/2023    Procedure: LAMINECTOMY, SPINE, CERVICAL, WITH POSTERIOR FUSION laminectomy C2-C7 fusion C2-T2;  Surgeon: Joshua Bah MD;  Location: [facility];  Service: Neurosurgery;  Laterality: N/A;       Family History   Problem Relation Age of Onset    Alcohol abuse Mother     Drug abuse Father        Social  "History     Socioeconomic History    Marital status:    Tobacco Use    Smoking status: Former     Current packs/day: 0.00     Average packs/day: 2.0 packs/day for 36.0 years (72.0 ttl pk-yrs)     Types: Cigarettes     Quit date: 10/24/2022     Years since quittin.7    Smokeless tobacco: Never   Substance and Sexual Activity    Alcohol use: No    Drug use: Yes     Types: Marijuana     Comment: Quit in 2023    Sexual activity: Never       Review of patient's allergies indicates:  No Known Allergies    Labs:  Lab Results   Component Value Date    HGBA1C 5.7 (H) 2023       Lab Results   Component Value Date    WBC 20.13 (H) 2023    HGB 12.4 (L) 2023    HCT 38.2 (L) 2023    MCV 95 2023     2023           Review of Systems:  Neck pain.  Arm pain..  Balance of review of systems is negative.    Physical Exam:  Vitals:    23 0759   BP: (!) 137/95   Pulse: 78   Weight: 72.3 kg (159 lb 4.5 oz)   Height: 5' 6" (1.676 m)   PainSc:   6   PainLoc: Hip     Body mass index is 25.71 kg/m².    Gen: NAD  Psych: mood appropriate for given condition  HEENT: eyes anicteric   CV: RRR, 2+ radial pulse  HEENT: anicteric   Respiratory: non-labored, no signs of respiratory distress  Abd: non-distended  Skin: warm, dry and intact.  Gait: Able to heel walk, toe walk. No antalgic gait.     Coordination:   Tandem walking coordination: normal    Cervical spine: ROM is full in flexion, extension and lateral rotation without increased pain.  Spurling's maneuver causes no neck pain to either side.  Myofascial exam: No Tenderness to palpation across cervical paraspinous region bilaterally.    Lumbar spine:  Lumbar spine: ROM is full with flexion extension and oblique extension with no increased pain.    Lonny's test causes no increased pain on either side.    Supine straight leg raise is negative bilaterally.    Internal and external rotation of the hip causes no increased pain on " either side.  Myofascial exam: No tenderness to palpation across lumbar paraspinous muscles. No tenderness to palpation over the bilateral greater trochanters and bilateral SI joint    Sensory:  Intact and symmetrical to light touch in C4-T1 dermatomes bilaterally. Intact and symmetrical to light touch in L1-S1 dermatomes bilaterally.    Motor:    Right Left   C4 Shoulder Abduction  5  5   C5 Elbow Flexion    5  5   C6 Wrist Extension  5  5   C7 Elbow Extension   5  5   C8/T1 Hand Intrinsics   5  5        Right Left   L2/3 Iliacus Hip flexion  5  5   L3/4 Qudratus Femoris Knee Extension  5  5   L4/5 Tib Anterior Ankle Dorsiflexion   5  5   L5/S1 Extensor Hallicus Longus Great toe extension  5  5   S1/S2 Gastroc/Soleus Plantar Flexion  5  5      Right Left   Triceps DTR 2+ 2+   Biceps DTR 2+ 2+   Brachioradialis DTR 2+ 2+   Patellar DTR 3+ 3+   Achilles DTR 3+ 3+   Jordan Absent  Absent   Clonus Absent Absent   Babinski Absent Absent       Imaging:  CT cervical spine 5-16-23:  routine postoperative appearance spanning C2-T2 with supplemental decompressive laminectomy changes.  No hardware complication or acute bony process.      Assessment/ plan:  Wild Galeano is a 58 y.o. year old male patient who has a past medical history of Cervical spinal stenosis, General anesthetics causing adverse effect in therapeutic use, Prediabetes, PTSD (post-traumatic stress disorder), and Smoker. He presents in referral from Rosie Foster for neck pain.  We had a long discussion about myelopathy and expeceted results of surgery.  He is doing very well and has seen improvement in strethcn and sensation return to the right arm.  Nerves heal and repair very slow, therefore the pain he feels in the arms from C6 radiculoapthy may still get better over the next 1 year.  However this is complicated by myelopathy and he may have residual effects of spinal cord compression for the remainder of his life.  He is only 3 months out from  surgery and he can continue to see improvement with more time.  Ressured and enouraged patience.      Problem List Items Addressed This Visit       Cervical disc disease with myelopathy    Relevant Medications    cyclobenzaprine (FLEXERIL) 10 MG tablet     Other Visit Diagnoses       S/P cervical spinal fusion    -  Primary    Relevant Medications    cyclobenzaprine (FLEXERIL) 10 MG tablet            Plan:   - take gabapentin as prescibed 600mg tid; in a few months can consider taperaing off if he is seeing improvement.  - flexeril to help with spasms  - appt with Dr. Espinoza in pain manageemnt to further dicuss and determine if any procedures can be considered or further medication      Follow Up: RTC as needed.    : Reviewed and consistent with medication use as prescribed.    Thank you for referring this interesting patient, and I look forward to continuing to collaborate in his care.        Iveth Melara PA-C  [facility]

## 2023-08-11 NOTE — Clinical Note
This is the patient I spoke of this morning with you.  Referred by neurosurgery to pain management.  Scheduled with me by Shelley Light (neurosurgery nurse). I did speak with Ke about it.

## 2023-08-14 ENCOUNTER — ANESTHESIA (OUTPATIENT)
Dept: ENDOSCOPY | Facility: HOSPITAL | Age: 58
End: 2023-08-14
Payer: MEDICARE

## 2023-08-14 ENCOUNTER — ANESTHESIA EVENT (OUTPATIENT)
Dept: ENDOSCOPY | Facility: HOSPITAL | Age: 58
End: 2023-08-14
Payer: MEDICARE

## 2023-08-14 ENCOUNTER — HOSPITAL ENCOUNTER (OUTPATIENT)
Facility: HOSPITAL | Age: 58
Discharge: HOME | End: 2023-08-14
Attending: COLON & RECTAL SURGERY | Admitting: COLON & RECTAL SURGERY
Payer: MEDICARE

## 2023-08-14 VITALS
SYSTOLIC BLOOD PRESSURE: 121 MMHG | HEIGHT: 66 IN | HEART RATE: 78 BPM | TEMPERATURE: 98 F | DIASTOLIC BLOOD PRESSURE: 81 MMHG | OXYGEN SATURATION: 98 % | WEIGHT: 158 LBS | BODY MASS INDEX: 25.39 KG/M2 | RESPIRATION RATE: 16 BRPM

## 2023-08-14 DIAGNOSIS — Z12.11 SCREEN FOR COLON CANCER: ICD-10-CM

## 2023-08-14 PROCEDURE — 45385 COLONOSCOPY W/LESION REMOVAL: CPT | Mod: PT,HCNC,, | Performed by: COLON & RECTAL SURGERY

## 2023-08-14 PROCEDURE — 27201089 HC SNARE, DISP (ANY): Mod: HCNC,PO | Performed by: COLON & RECTAL SURGERY

## 2023-08-14 PROCEDURE — 25000003 PHARM REV CODE 250: Mod: HCNC,PO | Performed by: NURSE ANESTHETIST, CERTIFIED REGISTERED

## 2023-08-14 PROCEDURE — 63600175 PHARM REV CODE 636 W HCPCS: Mod: HCNC,PO | Performed by: COLON & RECTAL SURGERY

## 2023-08-14 PROCEDURE — 45385 COLONOSCOPY W/LESION REMOVAL: CPT | Mod: PT,HCNC,PO | Performed by: COLON & RECTAL SURGERY

## 2023-08-14 PROCEDURE — 88305 TISSUE EXAM BY PATHOLOGIST: CPT | Mod: 59,HCNC,PO | Performed by: STUDENT IN AN ORGANIZED HEALTH CARE EDUCATION/TRAINING PROGRAM

## 2023-08-14 PROCEDURE — D9220A PRA ANESTHESIA: ICD-10-PCS | Mod: PT,CRNA,, | Performed by: NURSE ANESTHETIST, CERTIFIED REGISTERED

## 2023-08-14 PROCEDURE — D9220A PRA ANESTHESIA: ICD-10-PCS | Mod: PT,ANES,, | Performed by: ANESTHESIOLOGY

## 2023-08-14 PROCEDURE — 63600175 PHARM REV CODE 636 W HCPCS: Mod: HCNC,PO | Performed by: NURSE ANESTHETIST, CERTIFIED REGISTERED

## 2023-08-14 PROCEDURE — 37000009 HC ANESTHESIA EA ADD 15 MINS: Mod: HCNC,PO | Performed by: COLON & RECTAL SURGERY

## 2023-08-14 PROCEDURE — 88305 TISSUE EXAM BY PATHOLOGIST: CPT | Mod: 26,HCNC,, | Performed by: STUDENT IN AN ORGANIZED HEALTH CARE EDUCATION/TRAINING PROGRAM

## 2023-08-14 PROCEDURE — 88305 TISSUE EXAM BY PATHOLOGIST: ICD-10-PCS | Mod: 26,HCNC,, | Performed by: STUDENT IN AN ORGANIZED HEALTH CARE EDUCATION/TRAINING PROGRAM

## 2023-08-14 PROCEDURE — 45385 PR COLONOSCOPY,REMV LESN,SNARE: ICD-10-PCS | Mod: PT,HCNC,, | Performed by: COLON & RECTAL SURGERY

## 2023-08-14 PROCEDURE — 37000008 HC ANESTHESIA 1ST 15 MINUTES: Mod: HCNC,PO | Performed by: COLON & RECTAL SURGERY

## 2023-08-14 PROCEDURE — D9220A PRA ANESTHESIA: Mod: PT,CRNA,, | Performed by: NURSE ANESTHETIST, CERTIFIED REGISTERED

## 2023-08-14 PROCEDURE — D9220A PRA ANESTHESIA: Mod: PT,ANES,, | Performed by: ANESTHESIOLOGY

## 2023-08-14 RX ORDER — PROPOFOL 10 MG/ML
VIAL (ML) INTRAVENOUS
Status: DISCONTINUED | OUTPATIENT
Start: 2023-08-14 | End: 2023-08-14

## 2023-08-14 RX ORDER — SODIUM CHLORIDE, SODIUM LACTATE, POTASSIUM CHLORIDE, CALCIUM CHLORIDE 600; 310; 30; 20 MG/100ML; MG/100ML; MG/100ML; MG/100ML
INJECTION, SOLUTION INTRAVENOUS CONTINUOUS
Status: DISCONTINUED | OUTPATIENT
Start: 2023-08-14 | End: 2023-08-14

## 2023-08-14 RX ORDER — LIDOCAINE HYDROCHLORIDE 20 MG/ML
INJECTION INTRAVENOUS
Status: DISCONTINUED | OUTPATIENT
Start: 2023-08-14 | End: 2023-08-14

## 2023-08-14 RX ORDER — SODIUM CHLORIDE, SODIUM LACTATE, POTASSIUM CHLORIDE, CALCIUM CHLORIDE 600; 310; 30; 20 MG/100ML; MG/100ML; MG/100ML; MG/100ML
INJECTION, SOLUTION INTRAVENOUS CONTINUOUS
Status: DISCONTINUED | OUTPATIENT
Start: 2023-08-14 | End: 2023-08-14 | Stop reason: HOSPADM

## 2023-08-14 RX ADMIN — PROPOFOL 100 MG: 10 INJECTION, EMULSION INTRAVENOUS at 08:08

## 2023-08-14 RX ADMIN — SODIUM CHLORIDE, POTASSIUM CHLORIDE, SODIUM LACTATE AND CALCIUM CHLORIDE: 600; 310; 30; 20 INJECTION, SOLUTION INTRAVENOUS at 08:08

## 2023-08-14 RX ADMIN — PROPOFOL 50 MG: 10 INJECTION, EMULSION INTRAVENOUS at 09:08

## 2023-08-14 RX ADMIN — PROPOFOL 50 MG: 10 INJECTION, EMULSION INTRAVENOUS at 08:08

## 2023-08-14 RX ADMIN — LIDOCAINE HYDROCHLORIDE 100 MG: 20 INJECTION INTRAVENOUS at 08:08

## 2023-08-14 NOTE — H&P
History & Physical - Short Stay  Gastroenterology      SUBJECTIVE:     Procedure: Colonoscopy    Chief Complaint/Indication for Procedure: Screening    PTA Medications   Medication Sig    cyanocobalamin 1,000 mcg/mL injection Inject 1,000 mcg into the muscle every 30 days.    gabapentin (NEURONTIN) 600 MG tablet Take 1 tablet (600 mg total) by mouth 3 (three) times a day for 90 days.    amLODIPine (NORVASC) 5 MG tablet Take 1 tablet (5 mg total) by mouth in the morning for 90 days. (Patient not taking: Reported on 8/11/2023)    bisacodyL (DULCOLAX) 5 mg EC tablet Take as directed (Patient not taking: Reported on 8/11/2023)    cyclobenzaprine (FLEXERIL) 10 MG tablet Take 1 tablet (10 mg total) by mouth every 12 (twelve) hours as needed for muscle spasms/pain.    docusate sodium (COLACE) 100 MG capsule Take 2 capsules (200 mg total) by mouth once daily. (Patient not taking: Reported on 8/11/2023)    omeprazole (PRILOSEC) 20 MG capsule 20 mg once daily.    oxyCODONE-acetaminophen (PERCOCET) 7.5-325 mg per tablet Take 1 tablet by mouth every 6 (six) hours as needed for Pain. (Patient not taking: Reported on 8/11/2023)    polyethylene glycol (GLYCOLAX) 17 gram PwPk Take 17 g by mouth 2 (two) times daily as needed (constipation). (Patient not taking: Reported on 8/11/2023)    polyethylene glycol (GOLYTELY) 236-22.74-6.74 -5.86 gram suspension Take as directed (Patient not taking: Reported on 8/11/2023)    sildenafiL (VIAGRA) 100 MG tablet Take 100 mg by mouth as needed for Erectile Dysfunction.    traZODone (DESYREL) 100 MG tablet Take 1 tablet (100 mg total) by mouth nightly for 60 days. (Patient not taking: Reported on 8/11/2023)       Review of patient's allergies indicates:  No Known Allergies     Past Medical History:   Diagnosis Date    Cervical spinal stenosis     General anesthetics causing adverse effect in therapeutic use     Prediabetes     PTSD (post-traumatic stress disorder)     Smoker      Past Surgical  History:   Procedure Laterality Date    JOINT REPLACEMENT Left 2017    MAYI    POSTERIOR FUSION OF CERVICAL SPINE WITH LAMINECTOMY N/A 5/3/2023    Procedure: LAMINECTOMY, SPINE, CERVICAL, WITH POSTERIOR FUSION laminectomy C2-C7 fusion C2-T2;  Surgeon: Joshua Bah MD;  Location: [facility];  Service: Neurosurgery;  Laterality: N/A;     Family History   Problem Relation Age of Onset    Alcohol abuse Mother     Drug abuse Father      Social History     Tobacco Use    Smoking status: Former     Current packs/day: 0.00     Average packs/day: 2.0 packs/day for 36.0 years (72.0 ttl pk-yrs)     Types: Cigarettes     Quit date: 10/24/2022     Years since quittin.8    Smokeless tobacco: Never   Substance Use Topics    Alcohol use: No    Drug use: Yes     Types: Marijuana     Comment: Quit in 2023         OBJECTIVE:     Vital Signs (Most Recent)  Temp: 98.5 °F (36.9 °C) (23)  Pulse: 88 (23)  Resp: 12 (23)  BP: (!) 141/84 (23)  SpO2: 98 % (23)    Physical Exam:                                                       GENERAL:  Comfortable, in no acute distress.                                 HEENT EXAM:  Nonicteric.  No adenopathy.  Oropharynx is clear.               NECK:  Supple.                                                               LUNGS:  Clear.                                                               CARDIAC:  Regular rate and rhythm.  S1, S2.  No murmur.                      ABDOMEN:  Soft, positive bowel sounds, nontender.  No hepatosplenomegaly or masses.  No rebound or guarding.                                             EXTREMITIES:  No edema.     MENTAL STATUS:  Normal, alert and oriented.      ASSESSMENT/PLAN:     Assessment: Screening    Plan: Colonoscopy    Anesthesia Plan: Moderate Sedation    ASA Grade: ASA 2 - Patient with mild systemic disease with no functional limitations    MALLAMPATI SCORE:  I (soft palate, uvula, fauces,  and tonsillar pillars visible)

## 2023-08-14 NOTE — ANESTHESIA PREPROCEDURE EVALUATION
08/14/2023  Wild Galeano is a 58 y.o., male.      Pre-op Assessment    I have reviewed the Patient Summary Reports.     I have reviewed the Nursing Notes. I have reviewed the NPO Status.   I have reviewed the Medications.     Review of Systems  Anesthesia Hx:  Denies Family Hx of Anesthesia complications.   Denies Personal Hx of Anesthesia complications.   Hepatic/GI:   Bowel Prep.    Musculoskeletal:   quadriparesis improved post surgery Spine Disorders: cervical    Neurological:   Headaches   Chronic Pain Syndrome   Psych:   Psychiatric History          Physical Exam  General: Well nourished, Cooperative, Alert and Oriented    Airway:  Mallampati: II   Mouth Opening: Normal  TM Distance: Normal  Tongue: Normal  Neck ROM: Extension Decreased, Flexion Decreased, Left Lateral Motion Decreased, Right Lateral Motion Decreased    Dental:  Edentulous        Anesthesia Plan  Type of Anesthesia, risks & benefits discussed:    Anesthesia Type: Gen Natural Airway  Intra-op Monitoring Plan: Standard ASA Monitors  Induction:  IV  Informed Consent: Informed consent signed with the Patient and all parties understand the risks and agree with anesthesia plan.  All questions answered.   ASA Score: 3    Ready For Surgery From Anesthesia Perspective.     .

## 2023-08-14 NOTE — TRANSFER OF CARE
"Anesthesia Transfer of Care Note    Patient: Wild Galeano    Procedure(s) Performed: Procedure(s) (LRB):  COLONOSCOPY (N/A)    Patient location: GI    Anesthesia Type: general    Transport from OR: Transported from OR on room air with adequate spontaneous ventilation    Post pain: adequate analgesia    Post assessment: no apparent anesthetic complications and tolerated procedure well    Post vital signs: stable    Level of consciousness: awake, alert and oriented    Nausea/Vomiting: no nausea/vomiting    Complications: none    Transfer of care protocol was followed      Last vitals:   Visit Vitals  BP (!) 141/84 (BP Location: Left arm, Patient Position: Lying)   Pulse 88   Temp 36.9 °C (98.5 °F) (Skin)   Resp 12   Ht 5' 6" (1.676 m)   Wt 71.7 kg (158 lb)   SpO2 98%   BMI 25.50 kg/m²     "

## 2023-08-14 NOTE — ANESTHESIA POSTPROCEDURE EVALUATION
Anesthesia Post Evaluation    Patient: Wild Galeano    Procedure(s) Performed: Procedure(s) (LRB):  COLONOSCOPY (N/A)    Final Anesthesia Type: general      Patient location during evaluation: PACU  Patient participation: Yes- Able to Participate  Level of consciousness: awake and alert  Post-procedure vital signs: reviewed and stable  Pain management: adequate  Airway patency: patent    PONV status at discharge: No PONV  Anesthetic complications: no      Cardiovascular status: blood pressure returned to baseline  Respiratory status: unassisted  Hydration status: euvolemic  Follow-up not needed.          Vitals Value Taken Time   /81 08/14/23 0943   Temp 36.7 °C (98 °F) 08/14/23 0921   Pulse 78 08/14/23 0943   Resp 16 08/14/23 0943   SpO2 98 % 08/14/23 0943         No case tracking events are documented in the log.      Pain/Gladsi Score: Gladis Score: 10 (8/14/2023 10:00 AM)

## 2023-08-14 NOTE — BRIEF OP NOTE
[facility]  Brief Operative Note    Surgery Date: 8/14/2023     Surgeon(s) and Role:     * Jasson Meyer MD - Primary    Assisting Surgeon: None    Pre-op Diagnosis:  Screen for colon cancer [Z12.11]    Post-op Diagnosis:  colon polyps, diverticulosis    Procedure(s):  Colonoscopy and Polypectomy    Anesthesia: General    Operative Findings: Uneventful with normal findings    Estimated Blood Loss: * No values recorded between 8/14/2023  8:51 AM and 8/14/2023  9:16 AM *         Specimens:   Specimen (24h ago, onward)       Start     Ordered    08/14/23 0911  Specimen to Pathology, Surgery Gastrointestinal tract  Once        Comments: Pre-op Diagnosis: Screen for colon cancer [Z12.11]Procedure(s):COLONOSCOPY 1.) Descending colon polyp2.) Sigmoid colon polyp     References:    Click here for ordering Quick Tip   Question Answer Comment   Procedure Type: Gastrointestinal tract    Specimen Class: Routine/Screening    Which provider would you like to cc? JASSON MEYER    Release to patient Immediate        08/14/23 0916                      Discharge Note    OUTCOME: Patient tolerated treatment/procedure well without complication and is now ready for discharge.    DISPOSITION: Home or Self Care    FINAL DIAGNOSIS:  Screen for colon cancer [Z12.11]    FOLLOWUP: with PCP or as scheduled    DISCHARGE INSTRUCTIONS:    Resume diet.  Activity as tolerated.      No discharge procedures on file.

## 2023-08-14 NOTE — [employer] PATIENT INSTRUCTIONS
Discharge Summary/Instructions after an Endoscopic Procedure  Patient Name: Wild Galeano  Patient MRN: [id number]  Patient YOB: 1965  Monday, August 14, 2023  Jasson Peña MD  Dear patient,  As a result of recent federal legislation (The Federal Cures Act), you may   receive lab or pathology results from your procedure in your [facility]   account before your physician is able to contact you. Your physician or   their representative will relay the results to you with their   recommendations at their soonest availability.  Thank you,  RESTRICTIONS:  During your procedure today, you received medications for sedation.  These   medications may affect your judgment, balance and coordination.  Therefore,   for 24 hours, you have the following restrictions:   - DO NOT drive a car, operate machinery, make legal/financial decisions,   sign important papers or drink alcohol.    ACTIVITY:  Today: no heavy lifting, straining or running due to procedural   sedation/anesthesia.  The following day: return to full activity including work.  DIET:  Eat and drink normally unless instructed otherwise.     TREATMENT FOR COMMON SIDE EFFECTS:  - Mild abdominal pain, nausea, belching, bloating or excessive gas:  rest,   eat lightly and use a heating pad.  - Sore Throat: treat with throat lozenges and/or gargle with warm salt   water.  - Because air was used during the procedure, expelling large amounts of air   from your rectum or belching is normal.  - If a bowel prep was taken, you may not have a bowel movement for 1-3 days.    This is normal.  SYMPTOMS TO WATCH FOR AND REPORT TO YOUR PHYSICIAN:  1. Abdominal pain or bloating, other than gas cramps.  2. Chest pain.  3. Back pain.  4. Signs of infection such as: chills or fever occurring within 24 hours   after the procedure.  5. Rectal bleeding, which would show as bright red, maroon, or black stools.   (A tablespoon of blood from the rectum is not serious,  especially if   hemorrhoids are present.)  6. Vomiting.  7. Weakness or dizziness.  GO DIRECTLY TO THE NEAREST EMERGENCY ROOM IF YOU HAVE ANY OF THE FOLLOWING:      Difficulty breathing              Chills and/or fever over 101 F   Persistent vomiting and/or vomiting blood   Severe abdominal pain   Severe chest pain   Black, tarry stools   Bleeding- more than one tablespoon   Any other symptom or condition that you feel may need urgent attention  Your doctor recommends these additional instructions:  If any biopsies were taken, your doctors clinic will contact you in 1 to 2   weeks with any results.  Your physician has recommended a repeat colonoscopy in five years for   surveillance.   Return to your GI office as needed.   You are being discharged to home.   Resume your previous diet.   Continue your present medications.   You are being discharged to home.  For questions, problems or results please call your physician - Jasson Peña MD at Work:  [phone].  EMERGENCY PHONE NUMBER: [phone], LAB RESULTS: [phone]  IF A COMPLICATION OR EMERGENCY SITUATION ARISES AND YOU ARE UNABLE TO REACH   YOUR PHYSICIAN - GO DIRECTLY TO THE EMERGENCY ROOM.  ___________________________________________  Nurse Signature  ___________________________________________  Patient/Designated Responsible Party Signature  Jasson Peña M.D.  Jasson Peña MD  8/14/2023 9:21:53 AM  This report has been verified and signed electronically.  Dear patient,  As a result of recent federal legislation (The Federal Cures Act), you may   receive lab or pathology results from your procedure in your [facility]   account before your physician is able to contact you. Your physician or   their representative will relay the results to you with their   recommendations at their soonest availability.  Thank you.  [employer]ATION

## 2023-08-17 LAB
FINAL PATHOLOGIC DIAGNOSIS: NORMAL
GROSS: NORMAL
Lab: NORMAL

## 2023-09-20 ENCOUNTER — TELEPHONE (OUTPATIENT)
Dept: NEUROLOGY | Facility: CLINIC | Age: 58
End: 2023-09-20
Payer: MEDICARE

## 2023-09-20 ENCOUNTER — TELEPHONE (OUTPATIENT)
Dept: NEUROSURGERY | Facility: CLINIC | Age: 58
End: 2023-09-20
Payer: MEDICARE

## 2023-09-20 NOTE — TELEPHONE ENCOUNTER
----- Message from Nikolas Morataya sent at 9/20/2023  3:11 PM CDT -----  Regarding: question  Type: Needs Medical Advice    Who Called:  Wild Lew Call Back Number: please send msg through portal do not call    Additional Information: pt got invited to join family on boat. Pt wants to know if can or cannot. Please call to discuss.

## 2023-09-20 NOTE — TELEPHONE ENCOUNTER
----- Message from Chelsey Barboza sent at 9/20/2023  3:42 PM CDT -----  Contact: self  Type: Needs Medical Advice  Who Called:  pt  Best Call Back Number: [phone]   Additional Information: please call regarding my lower back surgery. Pt has a few questions

## 2023-09-20 NOTE — TELEPHONE ENCOUNTER
Patient advised to refrain from rough silva and any sort of wave jumping. Patient states understanding.

## 2023-09-22 ENCOUNTER — OFFICE VISIT (OUTPATIENT)
Dept: PAIN MEDICINE | Facility: CLINIC | Age: 58
End: 2023-09-22
Payer: MEDICARE

## 2023-09-22 VITALS
SYSTOLIC BLOOD PRESSURE: 147 MMHG | HEART RATE: 86 BPM | HEIGHT: 66 IN | WEIGHT: 162.13 LBS | DIASTOLIC BLOOD PRESSURE: 99 MMHG | BODY MASS INDEX: 26.06 KG/M2

## 2023-09-22 DIAGNOSIS — M50.00 CERVICAL DISC DISEASE WITH MYELOPATHY: Primary | ICD-10-CM

## 2023-09-22 DIAGNOSIS — M79.18 MYOFASCIAL PAIN SYNDROME, CERVICAL: ICD-10-CM

## 2023-09-22 DIAGNOSIS — M25.551 RIGHT HIP PAIN: ICD-10-CM

## 2023-09-22 DIAGNOSIS — Z98.1 S/P CERVICAL SPINAL FUSION: ICD-10-CM

## 2023-09-22 DIAGNOSIS — M54.16 RIGHT LUMBAR RADICULOPATHY: ICD-10-CM

## 2023-09-22 DIAGNOSIS — M54.16 LUMBAR RADICULOPATHY: ICD-10-CM

## 2023-09-22 PROCEDURE — 3008F PR BODY MASS INDEX (BMI) DOCUMENTED: ICD-10-PCS | Mod: HCNC,CPTII,S$GLB, | Performed by: ANESTHESIOLOGY

## 2023-09-22 PROCEDURE — 3044F PR MOST RECENT HEMOGLOBIN A1C LEVEL <7.0%: ICD-10-PCS | Mod: HCNC,CPTII,S$GLB, | Performed by: ANESTHESIOLOGY

## 2023-09-22 PROCEDURE — 99999 PR PBB SHADOW E&M-EST. PATIENT-LVL III: ICD-10-PCS | Mod: PBBFAC,HCNC,, | Performed by: ANESTHESIOLOGY

## 2023-09-22 PROCEDURE — 3080F DIAST BP >= 90 MM HG: CPT | Mod: HCNC,CPTII,S$GLB, | Performed by: ANESTHESIOLOGY

## 2023-09-22 PROCEDURE — 1159F PR MEDICATION LIST DOCUMENTED IN MEDICAL RECORD: ICD-10-PCS | Mod: HCNC,CPTII,S$GLB, | Performed by: ANESTHESIOLOGY

## 2023-09-22 PROCEDURE — 1159F MED LIST DOCD IN RCRD: CPT | Mod: HCNC,CPTII,S$GLB, | Performed by: ANESTHESIOLOGY

## 2023-09-22 PROCEDURE — 3080F PR MOST RECENT DIASTOLIC BLOOD PRESSURE >= 90 MM HG: ICD-10-PCS | Mod: HCNC,CPTII,S$GLB, | Performed by: ANESTHESIOLOGY

## 2023-09-22 PROCEDURE — 99999 PR PBB SHADOW E&M-EST. PATIENT-LVL III: CPT | Mod: PBBFAC,HCNC,, | Performed by: ANESTHESIOLOGY

## 2023-09-22 PROCEDURE — 3077F PR MOST RECENT SYSTOLIC BLOOD PRESSURE >= 140 MM HG: ICD-10-PCS | Mod: HCNC,CPTII,S$GLB, | Performed by: ANESTHESIOLOGY

## 2023-09-22 PROCEDURE — 99215 PR OFFICE/OUTPT VISIT, EST, LEVL V, 40-54 MIN: ICD-10-PCS | Mod: HCNC,S$GLB,, | Performed by: ANESTHESIOLOGY

## 2023-09-22 PROCEDURE — 3044F HG A1C LEVEL LT 7.0%: CPT | Mod: HCNC,CPTII,S$GLB, | Performed by: ANESTHESIOLOGY

## 2023-09-22 PROCEDURE — 3077F SYST BP >= 140 MM HG: CPT | Mod: HCNC,CPTII,S$GLB, | Performed by: ANESTHESIOLOGY

## 2023-09-22 PROCEDURE — 3008F BODY MASS INDEX DOCD: CPT | Mod: HCNC,CPTII,S$GLB, | Performed by: ANESTHESIOLOGY

## 2023-09-22 PROCEDURE — 99215 OFFICE O/P EST HI 40 MIN: CPT | Mod: HCNC,S$GLB,, | Performed by: ANESTHESIOLOGY

## 2023-09-22 NOTE — PROGRESS NOTES
[facility] New Patient Evaluation      Referred by: No ref. provider found    PCP: Medhat Alejo MD    CC:   Chief Complaint   Patient presents with    Neck Pain    Hip Pain    Leg Pain    Hand Pain          HPI:   Wild Galeano is a 58 y.o. year old male patient who has a past medical history of Cervical spinal stenosis, General anesthetics causing adverse effect in therapeutic use, Prediabetes, PTSD (post-traumatic stress disorder), and Smoker. He presents in referral from No ref. provider found for neck pain.  He returns in follow-up this is the 1st time I have seen him.  He has a history of chronic cervical stenosis and had been taking high-dose opioids for many years.  He states that he tripped and fell which precipitated weakness in his arms and legs, he was hospitalized and underwent anterior and posterior decompression and fusion in May, 2023.  He has a great deal of pain in his bilateral neck around the posterior incision site, muscle pain, numbness and tingling in his arms at times.    His other main complaint is pain in the right buttock, right lateral hip, right leg.  He has some pain in his back, radiates into the right hip with standing, walking, bending.  He does have some right groin pain as well.  He recently underwent a right hip joint injection intra-articular after seeing Dr. Dennis, the patient states that he felt this provided very good relief but only lasted 1-2 days.      Initial history: He underwent 5-3-23 C2-C7 fusion for myelopathy by Dr. Bah.  He has seen improvement in right C6 distribution numbness and weakness.  He continues to have axail neck pain, radiating to the bilateral arms and hands.  He has foot pain.  He takes tylenol, ibuprofen (recommended to stop as NSAIDs delay bone healing from fusion).  He takes gabapentin and has tried weaning down on it, but pain increased.  He is taking gabapentin one to two times per day.  He is going to PT 2 times per  week with care PT in [place].      Denies bowel/ bladder incontinence.    Past and current medications:  Antineuropathics:  gabapentin 600 mg 1-2 times per week  NSAIDs: (ibuprofen in past)  Antidepressants:  Muscle relaxers:  none  Opioids:  Has a history of very high-dose opioids in the past, sounds like 100 mg of morphine twice a day in addition to 30 mg oxycodone over 4 times a day but usually would run out of medication half way through the month.  Antiplatelets/Anticoagulants:  Ohters:  tylenol    Physical Therapy/ Chiropractic care:  PT - undergoing    Pain Intervention History:    Past Spine Surgical History:  He underwent 5-3-23 C2-C7 fusion for myelopathy by Dr. Bah.  Left hip replacement        History:    Current Outpatient Medications:     amLODIPine (NORVASC) 5 MG tablet, Take 1 tablet (5 mg total) by mouth in the morning for 90 days., Disp: 90 tablet, Rfl: 0    bisacodyL (DULCOLAX) 5 mg EC tablet, Take as directed, Disp: 2 tablet, Rfl: 0    cyanocobalamin 1,000 mcg/mL injection, Inject 1,000 mcg into the muscle every 30 days., Disp: , Rfl:     cyclobenzaprine (FLEXERIL) 10 MG tablet, Take 1 tablet (10 mg total) by mouth every 12 (twelve) hours as needed for muscle spasms/pain., Disp: 40 tablet, Rfl: 0    docusate sodium (COLACE) 100 MG capsule, Take 2 capsules (200 mg total) by mouth once daily., Disp: , Rfl: 0    gabapentin (NEURONTIN) 600 MG tablet, Take 1 tablet (600 mg total) by mouth 3 (three) times a day for 90 days., Disp: 270 tablet, Rfl: 0    omeprazole (PRILOSEC) 20 MG capsule, 20 mg once daily., Disp: , Rfl:     oxyCODONE-acetaminophen (PERCOCET) 7.5-325 mg per tablet, Take 1 tablet by mouth every 6 (six) hours as needed for Pain., Disp: 28 tablet, Rfl: 0    polyethylene glycol (GLYCOLAX) 17 gram PwPk, Take 17 g by mouth 2 (two) times daily as needed (constipation)., Disp: , Rfl: 0    polyethylene glycol (GOLYTELY) 236-22.74-6.74 -5.86 gram suspension, Take as directed, Disp:  4000 mL, Rfl: 0    sildenafiL (VIAGRA) 100 MG tablet, Take 100 mg by mouth as needed for Erectile Dysfunction., Disp: , Rfl:     traZODone (DESYREL) 100 MG tablet, Take 1 tablet (100 mg total) by mouth nightly for 60 days., Disp: 60 tablet, Rfl: 0    Past Medical History:   Diagnosis Date    Cervical spinal stenosis     General anesthetics causing adverse effect in therapeutic use     Prediabetes     PTSD (post-traumatic stress disorder)     Smoker        Past Surgical History:   Procedure Laterality Date    COLONOSCOPY N/A 2023    Procedure: COLONOSCOPY;  Surgeon: Jasson Peña MD;  Location: [facility] END[facility];  Service: Endoscopy;  Laterality: N/A;    JOINT REPLACEMENT Left 2017    MAYI    POSTERIOR FUSION OF CERVICAL SPINE WITH LAMINECTOMY N/A 5/3/2023    Procedure: LAMINECTOMY, SPINE, CERVICAL, WITH POSTERIOR FUSION laminectomy C2-C7 fusion C2-T2;  Surgeon: Joshua Bah MD;  Location[facility] OR;  Service: Neurosurgery;  Laterality: N/A;       Family History   Problem Relation Age of Onset    Alcohol abuse Mother     Drug abuse Father        Social History     Socioeconomic History    Marital status:    Tobacco Use    Smoking status: Former     Current packs/day: 0.00     Average packs/day: 2.0 packs/day for 36.0 years (72.0 ttl pk-yrs)     Types: Cigarettes     Quit date: 10/24/2022     Years since quittin.9    Smokeless tobacco: Never   Substance and Sexual Activity    Alcohol use: No    Drug use: Yes     Types: Marijuana     Comment: Quit in 2023    Sexual activity: Never       Review of patient's allergies indicates:  No Known Allergies    Labs:  Lab Results   Component Value Date    HGBA1C 5.7 (H) 2023       Lab Results   Component Value Date    WBC 20.13 (H) 2023    HGB 12.4 (L) 2023    HCT 38.2 (L) 2023    MCV 95 2023     2023           Review of Systems:  Neck pain.  Arm pain..  Balance of review of systems is negative.    Physical  "Exam:  Vitals:    09/22/23 0832   BP: (!) 147/99   Pulse: 86   Weight: 73.5 kg (162 lb 2.4 oz)   Height: 5' 6" (1.676 m)   PainSc:   7   PainLoc: Neck     Body mass index is 26.17 kg/m².    Gen: NAD  Psych: mood appropriate for given condition  HEENT: eyes anicteric   CV: RRR, 2+ radial pulse  HEENT: anicteric   Respiratory: non-labored, no signs of respiratory distress  Abd: non-distended  Skin: warm, dry and intact.  Gait: Able to heel walk, toe walk. No antalgic gait.     Coordination:   Tandem walking coordination: normal    Cervical spine: ROM is severely reduced in flexion, extension and lateral rotation with some increased pain on all maneuvers.  Spurling's maneuver deferred.  Myofascial exam: Tenderness to palpation across cervical paraspinous region bilaterally.    Lumbar spine:  Lumbar spine: ROM is full with flexion extension and oblique extension with no increased pain.    Lonny's test causes no increased pain on either side.    Supine straight leg raise is negative bilaterally.    Internal and external rotation of the hip causes no increased pain on either side.  Myofascial exam: No tenderness to palpation across lumbar paraspinous muscles. No tenderness to palpation over the bilateral greater trochanters and bilateral SI joint    Sensory:  Intact and symmetrical to light touch in C4-T1 dermatomes bilaterally. Intact and symmetrical to light touch in L1-S1 dermatomes bilaterally.    Motor:    Right Left   C4 Shoulder Abduction  5  5   C5 Elbow Flexion    5  5   C6 Wrist Extension  5  5   C7 Elbow Extension   5  5   C8/T1 Hand Intrinsics   5  5        Right Left   L2/3 Iliacus Hip flexion  5  5   L3/4 Qudratus Femoris Knee Extension  5  5   L4/5 Tib Anterior Ankle Dorsiflexion   5  5   L5/S1 Extensor Hallicus Longus Great toe extension  5  5   S1/S2 Gastroc/Soleus Plantar Flexion  5  5      Right Left   Triceps DTR 2+ 2+   Biceps DTR 2+ 2+   Brachioradialis DTR 2+ 2+   Patellar DTR 3+ 3+   Achilles DTR " "3+ 3+   Jordan Absent  Absent   Clonus Absent Absent   Babinski Absent Absent       Imaging:  CT cervical spine 5-16-23:  routine postoperative appearance spanning C2-T2 with supplemental decompressive laminectomy changes.  No hardware complication or acute bony process.    4/26/23 MRI C-spine and L-spine:     There is normal sagittal alignment.  There is no spondylolisthesis.  There is no abnormal marrow edema.  There is normal signal in the intervertebral discs.  There are small disc protrusions and circumferential broad-based disc bulges from C2-C3 through C6-C7.  All of these demonstrate mild-to-moderate canal stenosis.  This is most significant at C6-C7.  There is a small area of abnormal T2 hyperintensity within the cord at the level of C3-C4.  This is best visualized on series 20, image 23 with a "snake eyes" appearance.   Thoracic spine:   Alignment of the thoracic spine is intact.  There is no spondylolisthesis.  The thoracic cord is normal in signal and morphology.  There is no canal or foraminal stenosis throughout the entirety of the thoracic spine.   Lumbar spine: Sagittal alignment is maintained.  The conus terminates at the level of L1.  There is degenerative disc space narrowing at L3-L4.  There is no abnormal marrow edema.  There is mild narrowing of the central canal and bilateral neural foramina at L3-L4.  There is moderate bilateral neural foraminal narrowing at L4-L5 and L5-S1.    Assessment/ plan:  Wild Galeano is a 58 y.o. year old male patient who has a past medical history of Cervical spinal stenosis, General anesthetics causing adverse effect in therapeutic use, Prediabetes, PTSD (post-traumatic stress disorder), and Smoker. He presents in referral from No ref. provider found for neck pain.      1. Cervical disc disease with myelopathy        2. Myofascial pain syndrome, cervical        3. S/P cervical spinal fusion        4. Right lumbar radiculopathy        5. Right hip pain      "         Plan:  1.  In terms of his neck pain, we discussed that recently had surgery and this takes quite a long time to recover from this type of surgery but also he has chronic myelopathy, some things may never fully recover.  We discussed continuing gabapentin, he was worried about becoming dependent on anything and I explained that this medication is very commonly used long term without any problems.  We discussed the goal of hopefully helping his arm pain and even the neck pain with this.  2.  He did ask for possible pain medication and I explained that I would not restart him on opioids especially since he had been on such high dose opioids in the past without benefit and he would likely get into this same scenario with increasing doses and tolerance.  Furthermore, I am not sure why he was concerned about being dependent on something like gabapentin yet asking for opioid medication.  3. We discussed his back, right leg pain, right hip pain.  I think he does have some right hip joint pathology and likely had relief with the right hip joint injection but only lasting for several days.  We discussed trying a right L4/5 and L5/S1 transforaminal injection because he does have some foraminal narrowing at those levels.  If this does not produce significant benefit, I would have him follow-up with Dr. Dennis since this may be related to his hip joint pathology.  He will follow-up after the injection.    The total time spent for evaluation and management today including reviewing separately obtained history, performing a medically appropriate exam and evaluation, documenting clinical information in the health record, independently interpreting results and communicating them to the patient/family/caregiver, and ordering medications/tests/procedures was between 40-54 minutes.

## 2023-09-22 NOTE — H&P (VIEW-ONLY)
[facility] New Patient Evaluation      Referred by: No ref. provider found    PCP: Medhat Alejo MD    CC:   Chief Complaint   Patient presents with    Neck Pain    Hip Pain    Leg Pain    Hand Pain          HPI:   Wild Galeano is a 58 y.o. year old male patient who has a past medical history of Cervical spinal stenosis, General anesthetics causing adverse effect in therapeutic use, Prediabetes, PTSD (post-traumatic stress disorder), and Smoker. He presents in referral from No ref. provider found for neck pain.  He returns in follow-up this is the 1st time I have seen him.  He has a history of chronic cervical stenosis and had been taking high-dose opioids for many years.  He states that he tripped and fell which precipitated weakness in his arms and legs, he was hospitalized and underwent anterior and posterior decompression and fusion in May, 2023.  He has a great deal of pain in his bilateral neck around the posterior incision site, muscle pain, numbness and tingling in his arms at times.    His other main complaint is pain in the right buttock, right lateral hip, right leg.  He has some pain in his back, radiates into the right hip with standing, walking, bending.  He does have some right groin pain as well.  He recently underwent a right hip joint injection intra-articular after seeing Dr. Dennis, the patient states that he felt this provided very good relief but only lasted 1-2 days.      Initial history: He underwent 5-3-23 C2-C7 fusion for myelopathy by Dr. Bah.  He has seen improvement in right C6 distribution numbness and weakness.  He continues to have axail neck pain, radiating to the bilateral arms and hands.  He has foot pain.  He takes tylenol, ibuprofen (recommended to stop as NSAIDs delay bone healing from fusion).  He takes gabapentin and has tried weaning down on it, but pain increased.  He is taking gabapentin one to two times per day.  He is going to PT 2 times per  week with care PT in [place].      Denies bowel/ bladder incontinence.    Past and current medications:  Antineuropathics:  gabapentin 600 mg 1-2 times per week  NSAIDs: (ibuprofen in past)  Antidepressants:  Muscle relaxers:  none  Opioids:  Has a history of very high-dose opioids in the past, sounds like 100 mg of morphine twice a day in addition to 30 mg oxycodone over 4 times a day but usually would run out of medication half way through the month.  Antiplatelets/Anticoagulants:  Ohters:  tylenol    Physical Therapy/ Chiropractic care:  PT - undergoing    Pain Intervention History:    Past Spine Surgical History:  He underwent 5-3-23 C2-C7 fusion for myelopathy by Dr. Bah.  Left hip replacement        History:    Current Outpatient Medications:     amLODIPine (NORVASC) 5 MG tablet, Take 1 tablet (5 mg total) by mouth in the morning for 90 days., Disp: 90 tablet, Rfl: 0    bisacodyL (DULCOLAX) 5 mg EC tablet, Take as directed, Disp: 2 tablet, Rfl: 0    cyanocobalamin 1,000 mcg/mL injection, Inject 1,000 mcg into the muscle every 30 days., Disp: , Rfl:     cyclobenzaprine (FLEXERIL) 10 MG tablet, Take 1 tablet (10 mg total) by mouth every 12 (twelve) hours as needed for muscle spasms/pain., Disp: 40 tablet, Rfl: 0    docusate sodium (COLACE) 100 MG capsule, Take 2 capsules (200 mg total) by mouth once daily., Disp: , Rfl: 0    gabapentin (NEURONTIN) 600 MG tablet, Take 1 tablet (600 mg total) by mouth 3 (three) times a day for 90 days., Disp: 270 tablet, Rfl: 0    omeprazole (PRILOSEC) 20 MG capsule, 20 mg once daily., Disp: , Rfl:     oxyCODONE-acetaminophen (PERCOCET) 7.5-325 mg per tablet, Take 1 tablet by mouth every 6 (six) hours as needed for Pain., Disp: 28 tablet, Rfl: 0    polyethylene glycol (GLYCOLAX) 17 gram PwPk, Take 17 g by mouth 2 (two) times daily as needed (constipation)., Disp: , Rfl: 0    polyethylene glycol (GOLYTELY) 236-22.74-6.74 -5.86 gram suspension, Take as directed, Disp:  4000 mL, Rfl: 0    sildenafiL (VIAGRA) 100 MG tablet, Take 100 mg by mouth as needed for Erectile Dysfunction., Disp: , Rfl:     traZODone (DESYREL) 100 MG tablet, Take 1 tablet (100 mg total) by mouth nightly for 60 days., Disp: 60 tablet, Rfl: 0    Past Medical History:   Diagnosis Date    Cervical spinal stenosis     General anesthetics causing adverse effect in therapeutic use     Prediabetes     PTSD (post-traumatic stress disorder)     Smoker        Past Surgical History:   Procedure Laterality Date    COLONOSCOPY N/A 2023    Procedure: COLONOSCOPY;  Surgeon: Jasson Peña MD;  Location: [facility] ENDO;  Service: Endoscopy;  Laterality: N/A;    JOINT REPLACEMENT Left 2017    MAYI    POSTERIOR FUSION OF CERVICAL SPINE WITH LAMINECTOMY N/A 5/3/2023    Procedure: LAMINECTOMY, SPINE, CERVICAL, WITH POSTERIOR FUSION laminectomy C2-C7 fusion C2-T2;  Surgeon: Joshua Bah MD;  Location: [facility] OR;  Service: Neurosurgery;  Laterality: N/A;       Family History   Problem Relation Age of Onset    Alcohol abuse Mother     Drug abuse Father        Social History     Socioeconomic History    Marital status:    Tobacco Use    Smoking status: Former     Current packs/day: 0.00     Average packs/day: 2.0 packs/day for 36.0 years (72.0 ttl pk-yrs)     Types: Cigarettes     Quit date: 10/24/2022     Years since quittin.9    Smokeless tobacco: Never   Substance and Sexual Activity    Alcohol use: No    Drug use: Yes     Types: Marijuana     Comment: Quit in 2023    Sexual activity: Never       Review of patient's allergies indicates:  No Known Allergies    Labs:  Lab Results   Component Value Date    HGBA1C 5.7 (H) 2023       Lab Results   Component Value Date    WBC 20.13 (H) 2023    HGB 12.4 (L) 2023    HCT 38.2 (L) 2023    MCV 95 2023     2023           Review of Systems:  Neck pain.  Arm pain..  Balance of review of systems is negative.    Physical  "Exam:  Vitals:    09/22/23 0832   BP: (!) 147/99   Pulse: 86   Weight: 73.5 kg (162 lb 2.4 oz)   Height: 5' 6" (1.676 m)   PainSc:   7   PainLoc: Neck     Body mass index is 26.17 kg/m².    Gen: NAD  Psych: mood appropriate for given condition  HEENT: eyes anicteric   CV: RRR, 2+ radial pulse  HEENT: anicteric   Respiratory: non-labored, no signs of respiratory distress  Abd: non-distended  Skin: warm, dry and intact.  Gait: Able to heel walk, toe walk. No antalgic gait.     Coordination:   Tandem walking coordination: normal    Cervical spine: ROM is severely reduced in flexion, extension and lateral rotation with some increased pain on all maneuvers.  Spurling's maneuver deferred.  Myofascial exam: Tenderness to palpation across cervical paraspinous region bilaterally.    Lumbar spine:  Lumbar spine: ROM is full with flexion extension and oblique extension with no increased pain.    Lonny's test causes no increased pain on either side.    Supine straight leg raise is negative bilaterally.    Internal and external rotation of the hip causes no increased pain on either side.  Myofascial exam: No tenderness to palpation across lumbar paraspinous muscles. No tenderness to palpation over the bilateral greater trochanters and bilateral SI joint    Sensory:  Intact and symmetrical to light touch in C4-T1 dermatomes bilaterally. Intact and symmetrical to light touch in L1-S1 dermatomes bilaterally.    Motor:    Right Left   C4 Shoulder Abduction  5  5   C5 Elbow Flexion    5  5   C6 Wrist Extension  5  5   C7 Elbow Extension   5  5   C8/T1 Hand Intrinsics   5  5        Right Left   L2/3 Iliacus Hip flexion  5  5   L3/4 Qudratus Femoris Knee Extension  5  5   L4/5 Tib Anterior Ankle Dorsiflexion   5  5   L5/S1 Extensor Hallicus Longus Great toe extension  5  5   S1/S2 Gastroc/Soleus Plantar Flexion  5  5      Right Left   Triceps DTR 2+ 2+   Biceps DTR 2+ 2+   Brachioradialis DTR 2+ 2+   Patellar DTR 3+ 3+   Achilles DTR " "3+ 3+   Jordan Absent  Absent   Clonus Absent Absent   Babinski Absent Absent       Imaging:  CT cervical spine 5-16-23:  routine postoperative appearance spanning C2-T2 with supplemental decompressive laminectomy changes.  No hardware complication or acute bony process.    4/26/23 MRI C-spine and L-spine:     There is normal sagittal alignment.  There is no spondylolisthesis.  There is no abnormal marrow edema.  There is normal signal in the intervertebral discs.  There are small disc protrusions and circumferential broad-based disc bulges from C2-C3 through C6-C7.  All of these demonstrate mild-to-moderate canal stenosis.  This is most significant at C6-C7.  There is a small area of abnormal T2 hyperintensity within the cord at the level of C3-C4.  This is best visualized on series 20, image 23 with a "snake eyes" appearance.   Thoracic spine:   Alignment of the thoracic spine is intact.  There is no spondylolisthesis.  The thoracic cord is normal in signal and morphology.  There is no canal or foraminal stenosis throughout the entirety of the thoracic spine.   Lumbar spine: Sagittal alignment is maintained.  The conus terminates at the level of L1.  There is degenerative disc space narrowing at L3-L4.  There is no abnormal marrow edema.  There is mild narrowing of the central canal and bilateral neural foramina at L3-L4.  There is moderate bilateral neural foraminal narrowing at L4-L5 and L5-S1.    Assessment/ plan:  Wild Galeano is a 58 y.o. year old male patient who has a past medical history of Cervical spinal stenosis, General anesthetics causing adverse effect in therapeutic use, Prediabetes, PTSD (post-traumatic stress disorder), and Smoker. He presents in referral from No ref. provider found for neck pain.      1. Cervical disc disease with myelopathy        2. Myofascial pain syndrome, cervical        3. S/P cervical spinal fusion        4. Right lumbar radiculopathy        5. Right hip pain      "         Plan:  1.  In terms of his neck pain, we discussed that recently had surgery and this takes quite a long time to recover from this type of surgery but also he has chronic myelopathy, some things may never fully recover.  We discussed continuing gabapentin, he was worried about becoming dependent on anything and I explained that this medication is very commonly used long term without any problems.  We discussed the goal of hopefully helping his arm pain and even the neck pain with this.  2.  He did ask for possible pain medication and I explained that I would not restart him on opioids especially since he had been on such high dose opioids in the past without benefit and he would likely get into this same scenario with increasing doses and tolerance.  Furthermore, I am not sure why he was concerned about being dependent on something like gabapentin yet asking for opioid medication.  3. We discussed his back, right leg pain, right hip pain.  I think he does have some right hip joint pathology and likely had relief with the right hip joint injection but only lasting for several days.  We discussed trying a right L4/5 and L5/S1 transforaminal injection because he does have some foraminal narrowing at those levels.  If this does not produce significant benefit, I would have him follow-up with Dr. Dennis since this may be related to his hip joint pathology.  He will follow-up after the injection.    The total time spent for evaluation and management today including reviewing separately obtained history, performing a medically appropriate exam and evaluation, documenting clinical information in the health record, independently interpreting results and communicating them to the patient/family/caregiver, and ordering medications/tests/procedures was between 40-54 minutes.

## 2023-09-25 RX ORDER — ALPRAZOLAM 0.5 MG/1
1 TABLET, ORALLY DISINTEGRATING ORAL ONCE AS NEEDED
Status: CANCELLED | OUTPATIENT
Start: 2023-09-25 | End: 2035-02-21

## 2023-09-27 ENCOUNTER — TELEPHONE (OUTPATIENT)
Dept: FAMILY MEDICINE | Facility: CLINIC | Age: 58
End: 2023-09-27
Payer: MEDICARE

## 2023-09-27 NOTE — TELEPHONE ENCOUNTER
Called to reschedule AWV on 10/11/23- [facility] to return call to [phone]

## 2023-10-05 RX ORDER — ACETAMINOPHEN 325 MG/1
650 TABLET ORAL EVERY 6 HOURS PRN
COMMUNITY
End: 2024-01-24

## 2023-10-05 NOTE — OR NURSING
"Good morning,     During our pre-op call, Mr. Galeano states he has stopped taking his amlodipine as prescribed. He states he has noticed that his blood pressure has been elevated, but has been "hoping his body would correct itself" and that medication would not be required. Educated patient about effects of high blood pressure on his body and benefits of taking blood pressure medication as prescribed. Encouraged patient to contact PCP to discuss elevated blood pressure and medication management. Encouraged patient to keep record of blood pressures at home to present to PCP for discussion. Patient verbalized understanding.   "

## 2023-10-09 ENCOUNTER — HOSPITAL ENCOUNTER (OUTPATIENT)
Dept: RADIOLOGY | Facility: HOSPITAL | Age: 58
Discharge: HOME | End: 2023-10-09
Attending: ANESTHESIOLOGY | Admitting: ANESTHESIOLOGY
Payer: MEDICARE

## 2023-10-09 ENCOUNTER — HOSPITAL ENCOUNTER (OUTPATIENT)
Facility: HOSPITAL | Age: 58
Discharge: HOME | End: 2023-10-09
Attending: ANESTHESIOLOGY | Admitting: ANESTHESIOLOGY
Payer: MEDICARE

## 2023-10-09 DIAGNOSIS — M54.16 LUMBAR RADICULOPATHY: ICD-10-CM

## 2023-10-09 DIAGNOSIS — M54.50 LOWER BACK PAIN: ICD-10-CM

## 2023-10-09 DIAGNOSIS — M54.16 RIGHT LUMBAR RADICULOPATHY: ICD-10-CM

## 2023-10-09 PROCEDURE — 64484 PRA INJECT ANES/STEROID FORAMEN LUMBAR/SACRAL W IMG GUIDE ,EA ADD LEVEL: ICD-10-PCS | Mod: HCNC,RT,, | Performed by: ANESTHESIOLOGY

## 2023-10-09 PROCEDURE — 64483 PR EPIDURAL INJ, ANES/STEROID, TRANSFORAMINAL, LUMB/SACR, SNGL LEVL: ICD-10-PCS | Mod: HCNC,RT,, | Performed by: ANESTHESIOLOGY

## 2023-10-09 PROCEDURE — 64484 NJX AA&/STRD TFRM EPI L/S EA: CPT | Mod: HCNC,RT,, | Performed by: ANESTHESIOLOGY

## 2023-10-09 PROCEDURE — 64483 NJX AA&/STRD TFRM EPI L/S 1: CPT | Mod: HCNC,PO,RT | Performed by: ANESTHESIOLOGY

## 2023-10-09 PROCEDURE — 76000 FLUOROSCOPY <1 HR PHYS/QHP: CPT | Mod: TC,HCNC,PO

## 2023-10-09 PROCEDURE — 63600175 PHARM REV CODE 636 W HCPCS: Mod: HCNC,PO | Performed by: ANESTHESIOLOGY

## 2023-10-09 PROCEDURE — 64483 NJX AA&/STRD TFRM EPI L/S 1: CPT | Mod: HCNC,RT,, | Performed by: ANESTHESIOLOGY

## 2023-10-09 PROCEDURE — 25000003 PHARM REV CODE 250: Mod: HCNC,PO | Performed by: ANESTHESIOLOGY

## 2023-10-09 PROCEDURE — 64484 NJX AA&/STRD TFRM EPI L/S EA: CPT | Mod: HCNC,PO,RT | Performed by: ANESTHESIOLOGY

## 2023-10-09 PROCEDURE — 25500020 PHARM REV CODE 255: Mod: HCNC,PO | Performed by: ANESTHESIOLOGY

## 2023-10-09 RX ORDER — ALPRAZOLAM 0.5 MG/1
1 TABLET, ORALLY DISINTEGRATING ORAL ONCE AS NEEDED
Status: DISCONTINUED | OUTPATIENT
Start: 2023-10-09 | End: 2023-10-09 | Stop reason: HOSPADM

## 2023-10-09 RX ORDER — LIDOCAINE HYDROCHLORIDE 10 MG/ML
INJECTION, SOLUTION EPIDURAL; INFILTRATION; INTRACAUDAL; PERINEURAL
Status: DISCONTINUED | OUTPATIENT
Start: 2023-10-09 | End: 2023-10-09 | Stop reason: HOSPADM

## 2023-10-09 RX ORDER — METHYLPREDNISOLONE ACETATE 80 MG/ML
INJECTION, SUSPENSION INTRA-ARTICULAR; INTRALESIONAL; INTRAMUSCULAR; SOFT TISSUE
Status: DISCONTINUED | OUTPATIENT
Start: 2023-10-09 | End: 2023-10-09 | Stop reason: HOSPADM

## 2023-10-09 RX ORDER — BUPIVACAINE HYDROCHLORIDE 5 MG/ML
INJECTION, SOLUTION EPIDURAL; INTRACAUDAL
Status: DISCONTINUED | OUTPATIENT
Start: 2023-10-09 | End: 2023-10-09 | Stop reason: HOSPADM

## 2023-10-09 NOTE — OP NOTE
PROCEDURE DATE: 10/9/2023    PROCEDURE: Right L4/5 and L5/S1 transforaminal epidural steroid injection under fluoroscopy    DIAGNOSIS: Lumbar  Radiculopathy    Post op diagnosis: Same    PHYSICIAN: Les Espinoza MD    MEDICATIONS INJECTED:  Methylprednisolone 40mg (1ml) and 1ml 0.25% bupivicaine at each nerve root.     LOCAL ANESTHETIC INJECTED:  Lidocaine 1%. 4 ml per site.    SEDATION MEDICATIONS: none    ESTIMATED BLOOD LOSS:  none    COMPLICATIONS:  none    TECHNIQUE:   A time-out was taken to identify patient and procedure side prior to starting the procedure. The patient was placed in a prone position, prepped and draped in the usual sterile fashion using ChloraPrep and sterile towels.  The area to be injected was determined under fluoroscopic guidance in AP and oblique view.  Local anesthetic was given by raising a wheal and going down to the hub of a 25-gauge 1.5 inch needle.  In oblique view, a 3.5 inch 22-gauge bent-tip spinal needle was introduced towards 6 oclock position of the pedicle of each above named nerve root level.  The needle was walked medially then hinged into the neural foramen and position was confirmed in AP and lateral views.  Omnipaque contrast dye was injected to confirm appropriate placement and that there was no vascular uptake.  After negative aspiration for blood or CSF, the medication was then injected. This was performed at the right L4/5 and L5/S1 level(s). The patient tolerated the procedure well.    The patient was monitored after the procedure.  Patient was given post procedure and discharge instructions to follow at home. The patient was discharged in a stable condition.

## 2023-10-09 NOTE — DISCHARGE SUMMARY
[facility] - [facility]  Discharge Note  Short Stay    Procedure(s) (LRB):  Injection,steroid,epidural,transforaminal approach L4/5 and L5/S1 (Right)      OUTCOME: Patient tolerated treatment/procedure well without complication and is now ready for discharge.    DISPOSITION: Home or Self Care    FINAL DIAGNOSIS:  Lumbar radiculopathy    FOLLOWUP: In clinic    DISCHARGE INSTRUCTIONS:    Discharge Procedure Orders   Diet Adult Regular     No dressing needed     Notify your health care provider if you experience any of the following:  temperature >100.4     Activity as tolerated

## 2023-10-10 VITALS
BODY MASS INDEX: 26.03 KG/M2 | SYSTOLIC BLOOD PRESSURE: 162 MMHG | HEART RATE: 61 BPM | WEIGHT: 162 LBS | TEMPERATURE: 98 F | HEIGHT: 66 IN | RESPIRATION RATE: 16 BRPM | DIASTOLIC BLOOD PRESSURE: 98 MMHG | OXYGEN SATURATION: 97 %

## 2023-10-30 ENCOUNTER — OFFICE VISIT (OUTPATIENT)
Dept: PAIN MEDICINE | Facility: CLINIC | Age: 58
End: 2023-10-30
Payer: MEDICARE

## 2023-10-30 VITALS
HEART RATE: 101 BPM | WEIGHT: 157.19 LBS | DIASTOLIC BLOOD PRESSURE: 90 MMHG | HEIGHT: 66 IN | BODY MASS INDEX: 25.26 KG/M2 | SYSTOLIC BLOOD PRESSURE: 139 MMHG

## 2023-10-30 DIAGNOSIS — Z98.1 S/P CERVICAL SPINAL FUSION: ICD-10-CM

## 2023-10-30 DIAGNOSIS — M50.00 CERVICAL DISC DISEASE WITH MYELOPATHY: ICD-10-CM

## 2023-10-30 DIAGNOSIS — M25.551 RIGHT HIP PAIN: Primary | ICD-10-CM

## 2023-10-30 DIAGNOSIS — M51.36 DDD (DEGENERATIVE DISC DISEASE), LUMBAR: ICD-10-CM

## 2023-10-30 PROCEDURE — 99999 PR PBB SHADOW E&M-EST. PATIENT-LVL III: CPT | Mod: PBBFAC,HCNC,, | Performed by: PHYSICIAN ASSISTANT

## 2023-10-30 PROCEDURE — 1160F PR REVIEW ALL MEDS BY PRESCRIBER/CLIN PHARMACIST DOCUMENTED: ICD-10-PCS | Mod: HCNC,CPTII,S$GLB, | Performed by: PHYSICIAN ASSISTANT

## 2023-10-30 PROCEDURE — 3008F PR BODY MASS INDEX (BMI) DOCUMENTED: ICD-10-PCS | Mod: HCNC,CPTII,S$GLB, | Performed by: PHYSICIAN ASSISTANT

## 2023-10-30 PROCEDURE — 3080F PR MOST RECENT DIASTOLIC BLOOD PRESSURE >= 90 MM HG: ICD-10-PCS | Mod: HCNC,CPTII,S$GLB, | Performed by: PHYSICIAN ASSISTANT

## 2023-10-30 PROCEDURE — 99213 PR OFFICE/OUTPT VISIT, EST, LEVL III, 20-29 MIN: ICD-10-PCS | Mod: HCNC,S$GLB,, | Performed by: PHYSICIAN ASSISTANT

## 2023-10-30 PROCEDURE — 1159F PR MEDICATION LIST DOCUMENTED IN MEDICAL RECORD: ICD-10-PCS | Mod: HCNC,CPTII,S$GLB, | Performed by: PHYSICIAN ASSISTANT

## 2023-10-30 PROCEDURE — 3044F PR MOST RECENT HEMOGLOBIN A1C LEVEL <7.0%: ICD-10-PCS | Mod: HCNC,CPTII,S$GLB, | Performed by: PHYSICIAN ASSISTANT

## 2023-10-30 PROCEDURE — 1159F MED LIST DOCD IN RCRD: CPT | Mod: HCNC,CPTII,S$GLB, | Performed by: PHYSICIAN ASSISTANT

## 2023-10-30 PROCEDURE — 3008F BODY MASS INDEX DOCD: CPT | Mod: HCNC,CPTII,S$GLB, | Performed by: PHYSICIAN ASSISTANT

## 2023-10-30 PROCEDURE — 3080F DIAST BP >= 90 MM HG: CPT | Mod: HCNC,CPTII,S$GLB, | Performed by: PHYSICIAN ASSISTANT

## 2023-10-30 PROCEDURE — 99999 PR PBB SHADOW E&M-EST. PATIENT-LVL III: ICD-10-PCS | Mod: PBBFAC,HCNC,, | Performed by: PHYSICIAN ASSISTANT

## 2023-10-30 PROCEDURE — 1160F RVW MEDS BY RX/DR IN RCRD: CPT | Mod: HCNC,CPTII,S$GLB, | Performed by: PHYSICIAN ASSISTANT

## 2023-10-30 PROCEDURE — 99213 OFFICE O/P EST LOW 20 MIN: CPT | Mod: HCNC,S$GLB,, | Performed by: PHYSICIAN ASSISTANT

## 2023-10-30 PROCEDURE — 3075F SYST BP GE 130 - 139MM HG: CPT | Mod: HCNC,CPTII,S$GLB, | Performed by: PHYSICIAN ASSISTANT

## 2023-10-30 PROCEDURE — 3044F HG A1C LEVEL LT 7.0%: CPT | Mod: HCNC,CPTII,S$GLB, | Performed by: PHYSICIAN ASSISTANT

## 2023-10-30 PROCEDURE — 3075F PR MOST RECENT SYSTOLIC BLOOD PRESS GE 130-139MM HG: ICD-10-PCS | Mod: HCNC,CPTII,S$GLB, | Performed by: PHYSICIAN ASSISTANT

## 2023-10-30 NOTE — PROGRESS NOTES
[facility] follow-up appointment      Referred by: No ref. provider found    PCP: Medhat Alejo MD    CC:   Chief Complaint   Patient presents with    Hip Pain     Low back hip  leg pain          HPI:   Wild Galeano is a 58 y.o. year old male patient who has a past medical history of Cervical spinal stenosis, General anesthetics causing adverse effect in therapeutic use, Hypertension, Prediabetes, PTSD (post-traumatic stress disorder), and Smoker. He presents in referral from No ref. provider found for neck pain.  He is status post right L4/5 and L5/S1 transforaminal epidural steroid injection on 10/09/2023 with minimal relief.  The patient is new to me.  He does report some right low back pain but the majority of his pain is in the right groin.  He states that his leg will give out on him while he was walking.  He continues to have numbness in his hands and his feet.      09/22/2023:  He returns in follow-up this is the 1st time I have seen him.  He has a history of chronic cervical stenosis and had been taking high-dose opioids for many years.  He states that he tripped and fell which precipitated weakness in his arms and legs, he was hospitalized and underwent anterior and posterior decompression and fusion in May, 2023.  He has a great deal of pain in his bilateral neck around the posterior incision site, muscle pain, numbness and tingling in his arms at times.    His other main complaint is pain in the right buttock, right lateral hip, right leg.  He has some pain in his back, radiates into the right hip with standing, walking, bending.  He does have some right groin pain as well.  He recently underwent a right hip joint injection intra-articular after seeing Dr. Dennis, the patient states that he felt this provided very good relief but only lasted 1-2 days.      Initial history: He underwent 5-3-23 C2-C7 fusion for myelopathy by Dr. Bah.  He has seen improvement in right C6  distribution numbness and weakness.  He continues to have axail neck pain, radiating to the bilateral arms and hands.  He has foot pain.  He takes tylenol, ibuprofen (recommended to stop as NSAIDs delay bone healing from fusion).  He takes gabapentin and has tried weaning down on it, but pain increased.  He is taking gabapentin one to two times per day.  He is going to PT 2 times per week with care PT in [place].      Denies bowel/ bladder incontinence.    Past and current medications:  Antineuropathics:  gabapentin 600 mg 1-2 times per week  NSAIDs: (ibuprofen in past)  Antidepressants:  Muscle relaxers:  none  Opioids:  Has a history of very high-dose opioids in the past, sounds like 100 mg of morphine twice a day in addition to 30 mg oxycodone over 4 times a day but usually would run out of medication half way through the month.  Antiplatelets/Anticoagulants:  Ohters:  tylenol    Physical Therapy/ Chiropractic care:  PT - undergoing    Pain Intervention History:    He is status post right L4/5 and L5/S1 transforaminal epidural steroid injection on 10/09/2023 with minimal relief.     Past Spine Surgical History:  He underwent 5-3-23 C2-C7 fusion for myelopathy by Dr. Bah.  Left hip replacement      History:    Current Outpatient Medications:     acetaminophen (TYLENOL) 325 MG tablet, Take 650 mg by mouth every 6 (six) hours as needed for Pain., Disp: , Rfl:     amLODIPine (NORVASC) 5 MG tablet, Take 1 tablet (5 mg total) by mouth in the morning for 90 days., Disp: 90 tablet, Rfl: 0    bisacodyL (DULCOLAX) 5 mg EC tablet, Take as directed, Disp: 2 tablet, Rfl: 0    cyanocobalamin 1,000 mcg/mL injection, Inject 1,000 mcg into the muscle every 30 days., Disp: , Rfl:     cyclobenzaprine (FLEXERIL) 10 MG tablet, Take 1 tablet (10 mg total) by mouth every 12 (twelve) hours as needed for muscle spasms/pain., Disp: 40 tablet, Rfl: 0    docusate sodium (COLACE) 100 MG capsule, Take 2 capsules (200 mg total) by  mouth once daily., Disp: , Rfl: 0    gabapentin (NEURONTIN) 600 MG tablet, Take 1 tablet (600 mg total) by mouth 3 (three) times a day for 90 days., Disp: 270 tablet, Rfl: 0    omeprazole (PRILOSEC) 20 MG capsule, 20 mg once daily., Disp: , Rfl:     polyethylene glycol (GLYCOLAX) 17 gram PwPk, Take 17 g by mouth 2 (two) times daily as needed (constipation)., Disp: , Rfl: 0    polyethylene glycol (GOLYTELY) 236-22.74-6.74 -5.86 gram suspension, Take as directed, Disp: 4000 mL, Rfl: 0    sildenafiL (VIAGRA) 100 MG tablet, Take 100 mg by mouth as needed for Erectile Dysfunction., Disp: , Rfl:     traZODone (DESYREL) 100 MG tablet, Take 1 tablet (100 mg total) by mouth nightly for 60 days., Disp: 60 tablet, Rfl: 0    Past Medical History:   Diagnosis Date    Cervical spinal stenosis     General anesthetics causing adverse effect in therapeutic use     Hypertension     Prediabetes     PTSD (post-traumatic stress disorder)     Smoker        Past Surgical History:   Procedure Laterality Date    COLONOSCOPY N/A 8/14/2023    Procedure: COLONOSCOPY;  Surgeon: Jasson Peña MD;  Location: [facility] ENDO;  Service: Endoscopy;  Laterality: N/A;    JOINT REPLACEMENT Left 03/2017    MAYI    POSTERIOR FUSION OF CERVICAL SPINE WITH LAMINECTOMY N/A 5/3/2023    Procedure: LAMINECTOMY, SPINE, CERVICAL, WITH POSTERIOR FUSION laminectomy C2-C7 fusion C2-T2;  Surgeon: Joshua Bah MD;  Location: [facility] OR;  Service: Neurosurgery;  Laterality: N/A;    TRANSFORAMINAL EPIDURAL INJECTION OF STEROID Right 10/9/2023    Procedure: Injection,steroid,epidural,transforaminal approach L4/5 and L5/S1;  Surgeon: Les Espinoza MD;  Location: [facility] OR;  Service: Pain Management;  Laterality: Right;       Family History   Problem Relation Age of Onset    Alcohol abuse Mother     Drug abuse Father        Social History     Socioeconomic History    Marital status:    Tobacco Use    Smoking status: Former     Current packs/day: 0.00      "Average packs/day: 2.0 packs/day for 36.0 years (72.0 ttl pk-yrs)     Types: Cigarettes     Quit date: 10/24/2022     Years since quittin.0    Smokeless tobacco: Never   Substance and Sexual Activity    Alcohol use: No    Drug use: Yes     Frequency: 7.0 times per week     Types: Marijuana    Sexual activity: Never       Review of patient's allergies indicates:  No Known Allergies    Labs:  Lab Results   Component Value Date    HGBA1C 5.7 (H) 2023       Lab Results   Component Value Date    WBC 20.13 (H) 2023    HGB 12.4 (L) 2023    HCT 38.2 (L) 2023    MCV 95 2023     2023           Review of Systems:  Neck pain.  Arm pain..  Balance of review of systems is negative.    Physical Exam:  Vitals:    10/30/23 1030   BP: (!) 139/90   Pulse: 101   Weight: 71.3 kg (157 lb 3 oz)   Height: 5' 6" (1.676 m)   PainSc:   8   PainLoc: Hip     Body mass index is 25.37 kg/m².        10/30/2023    10:28 AM 2023     8:32 AM 2018     3:23 PM   Last 3 PDI Scores   Pain Disability Index (PDI) 35 40 41         Gen: NAD      Imaging:  CT cervical spine 23:  routine postoperative appearance spanning C2-T2 with supplemental decompressive laminectomy changes.  No hardware complication or acute bony process.    23 MRI C-spine and L-spine:     There is normal sagittal alignment.  There is no spondylolisthesis.  There is no abnormal marrow edema.  There is normal signal in the intervertebral discs.  There are small disc protrusions and circumferential broad-based disc bulges from C2-C3 through C6-C7.  All of these demonstrate mild-to-moderate canal stenosis.  This is most significant at C6-C7.  There is a small area of abnormal T2 hyperintensity within the cord at the level of C3-C4.  This is best visualized on series 20, image 23 with a "snake eyes" appearance.   Thoracic spine:   Alignment of the thoracic spine is intact.  There is no spondylolisthesis.  The thoracic cord is " normal in signal and morphology.  There is no canal or foraminal stenosis throughout the entirety of the thoracic spine.   Lumbar spine: Sagittal alignment is maintained.  The conus terminates at the level of L1.  There is degenerative disc space narrowing at L3-L4.  There is no abnormal marrow edema.  There is mild narrowing of the central canal and bilateral neural foramina at L3-L4.  There is moderate bilateral neural foraminal narrowing at L4-L5 and L5-S1.    Assessment/ plan:  Wild Galeano is a 58 y.o. year old male patient who has a past medical history of Cervical spinal stenosis, General anesthetics causing adverse effect in therapeutic use, Hypertension, Prediabetes, PTSD (post-traumatic stress disorder), and Smoker. He presents in referral from No ref. provider found for neck pain.      1. Right hip pain        2. DDD (degenerative disc disease), lumbar        3. Cervical disc disease with myelopathy        4. S/P cervical spinal fusion              Plan:  1. The patient did not have significant relief following the lumbar epidural steroid injection.  His symptoms today are most consistent with right hip joint pain and I advised follow-up with Dr. Link.  He will also follow-up with Dr. Bah next month.  2. He requested opioids today and I explained that we do not recommend this.  He asked several times and stated he will go somewhere else so the visit was ended abruptly.

## 2023-11-14 ENCOUNTER — HOSPITAL ENCOUNTER (OUTPATIENT)
Dept: RADIOLOGY | Facility: HOSPITAL | Age: 58
Discharge: HOME | End: 2023-11-14
Attending: NEUROLOGICAL SURGERY
Payer: MEDICARE

## 2023-11-14 ENCOUNTER — OFFICE VISIT (OUTPATIENT)
Dept: NEUROSURGERY | Facility: CLINIC | Age: 58
End: 2023-11-14
Payer: MEDICARE

## 2023-11-14 VITALS
HEIGHT: 66 IN | RESPIRATION RATE: 18 BRPM | BODY MASS INDEX: 25.23 KG/M2 | SYSTOLIC BLOOD PRESSURE: 119 MMHG | HEART RATE: 74 BPM | WEIGHT: 157 LBS | DIASTOLIC BLOOD PRESSURE: 83 MMHG

## 2023-11-14 DIAGNOSIS — Z98.1 S/P CERVICAL SPINAL FUSION: Primary | ICD-10-CM

## 2023-11-14 DIAGNOSIS — Z98.1 S/P CERVICAL SPINAL FUSION: ICD-10-CM

## 2023-11-14 PROCEDURE — 99214 OFFICE O/P EST MOD 30 MIN: CPT | Mod: HCNC,S$GLB,, | Performed by: PHYSICIAN ASSISTANT

## 2023-11-14 PROCEDURE — 3044F HG A1C LEVEL LT 7.0%: CPT | Mod: HCNC,CPTII,S$GLB, | Performed by: PHYSICIAN ASSISTANT

## 2023-11-14 PROCEDURE — 3079F DIAST BP 80-89 MM HG: CPT | Mod: HCNC,CPTII,S$GLB, | Performed by: PHYSICIAN ASSISTANT

## 2023-11-14 PROCEDURE — 72125 CT NECK SPINE W/O DYE: CPT | Mod: 26,HCNC,, | Performed by: RADIOLOGY

## 2023-11-14 PROCEDURE — 72125 CT CERVICAL SPINE WITHOUT CONTRAST: ICD-10-PCS | Mod: 26,HCNC,, | Performed by: RADIOLOGY

## 2023-11-14 PROCEDURE — 3079F PR MOST RECENT DIASTOLIC BLOOD PRESSURE 80-89 MM HG: ICD-10-PCS | Mod: HCNC,CPTII,S$GLB, | Performed by: PHYSICIAN ASSISTANT

## 2023-11-14 PROCEDURE — 3008F PR BODY MASS INDEX (BMI) DOCUMENTED: ICD-10-PCS | Mod: HCNC,CPTII,S$GLB, | Performed by: PHYSICIAN ASSISTANT

## 2023-11-14 PROCEDURE — 3044F PR MOST RECENT HEMOGLOBIN A1C LEVEL <7.0%: ICD-10-PCS | Mod: HCNC,CPTII,S$GLB, | Performed by: PHYSICIAN ASSISTANT

## 2023-11-14 PROCEDURE — 72125 CT NECK SPINE W/O DYE: CPT | Mod: TC,HCNC,PO

## 2023-11-14 PROCEDURE — 3074F PR MOST RECENT SYSTOLIC BLOOD PRESSURE < 130 MM HG: ICD-10-PCS | Mod: HCNC,CPTII,S$GLB, | Performed by: PHYSICIAN ASSISTANT

## 2023-11-14 PROCEDURE — 1159F PR MEDICATION LIST DOCUMENTED IN MEDICAL RECORD: ICD-10-PCS | Mod: HCNC,CPTII,S$GLB, | Performed by: PHYSICIAN ASSISTANT

## 2023-11-14 PROCEDURE — 1159F MED LIST DOCD IN RCRD: CPT | Mod: HCNC,CPTII,S$GLB, | Performed by: PHYSICIAN ASSISTANT

## 2023-11-14 PROCEDURE — 3008F BODY MASS INDEX DOCD: CPT | Mod: HCNC,CPTII,S$GLB, | Performed by: PHYSICIAN ASSISTANT

## 2023-11-14 PROCEDURE — 3074F SYST BP LT 130 MM HG: CPT | Mod: HCNC,CPTII,S$GLB, | Performed by: PHYSICIAN ASSISTANT

## 2023-11-14 PROCEDURE — 99214 PR OFFICE/OUTPT VISIT, EST, LEVL IV, 30-39 MIN: ICD-10-PCS | Mod: HCNC,S$GLB,, | Performed by: PHYSICIAN ASSISTANT

## 2023-11-14 NOTE — PROGRESS NOTES
Neurosurgery History and Physical    Patient ID: Wild Galeano is a 58 y.o. male.    Chief Complaint   Patient presents with    Follow-up     Patient present to clinic today as 3 month follow up with imaging        Patient is a 58 year old male who presents today for a follow up now 6 months status post C2-T2 posterior fusion doing well overall. He has stiffness and lack of range of motion in the neck, as expected post fusion. He has no pain that radiates down the arms and has improvement in his numbness. Numbness in the finger tips is now intermittent rather than constantly as it was pre-op. He has noticed a new burning sensation in the medial aspect of the right arm that occurs randomly, lasting 15-30 minutes at time. He stretches and shakes out his arm for relief. He feels like has lost strength overall, but has no specific weakness.    He has been seeing orthopedics and pain management for his lumbar spine and hip pain. He states the right hip pain is located mostly in the anterior groin and feels similar to pain he had prior to his left total hip arthroplasty. He has pain and stiffness with internal and external rotation, unable to put on his socks and shoes while seated. He has no pain radiating down the leg into the foot, only pain in the groin. He has occasional numbness in his bilateral toes, but nothing worsening or consistent. He had an injection into the hip with good relief and was referred to PT. The lumbar spine injection at L4-5 and L5-S1 did not provide much relief to his right sided pain.    Review of Systems   Constitutional:  Negative for diaphoresis and fever.   Eyes:  Negative for visual disturbance.   Respiratory:  Negative for cough and shortness of breath.    Cardiovascular:  Negative for chest pain.   Gastrointestinal:  Negative for abdominal pain, diarrhea, nausea and vomiting.   Musculoskeletal:  Positive for arthralgias, back pain, gait problem, neck pain and neck stiffness.    Neurological:  Positive for numbness. Negative for weakness.   All other systems reviewed and are negative.      Past Medical History:   Diagnosis Date    Cervical spinal stenosis     General anesthetics causing adverse effect in therapeutic use     Hypertension     Prediabetes     PTSD (post-traumatic stress disorder)     Smoker      Social History     Socioeconomic History    Marital status:    Tobacco Use    Smoking status: Former     Current packs/day: 0.00     Average packs/day: 2.0 packs/day for 36.0 years (72.0 ttl pk-yrs)     Types: Cigarettes     Quit date: 10/24/2022     Years since quittin.0    Smokeless tobacco: Never   Substance and Sexual Activity    Alcohol use: No    Drug use: Yes     Frequency: 7.0 times per week     Types: Marijuana    Sexual activity: Never     Family History   Problem Relation Age of Onset    Alcohol abuse Mother     Drug abuse Father      Review of patient's allergies indicates:  No Known Allergies    Current Outpatient Medications:     acetaminophen (TYLENOL) 325 MG tablet, Take 650 mg by mouth every 6 (six) hours as needed for Pain., Disp: , Rfl:     amLODIPine (NORVASC) 5 MG tablet, Take 1 tablet (5 mg total) by mouth in the morning for 90 days. (Patient not taking: Reported on 2023), Disp: 90 tablet, Rfl: 0    bisacodyL (DULCOLAX) 5 mg EC tablet, Take as directed (Patient not taking: Reported on 2023), Disp: 2 tablet, Rfl: 0    cyanocobalamin 1,000 mcg/mL injection, Inject 1,000 mcg into the muscle every 30 days., Disp: , Rfl:     cyclobenzaprine (FLEXERIL) 10 MG tablet, Take 1 tablet (10 mg total) by mouth every 12 (twelve) hours as needed for muscle spasms/pain. (Patient not taking: Reported on 2023), Disp: 40 tablet, Rfl: 0    docusate sodium (COLACE) 100 MG capsule, Take 2 capsules (200 mg total) by mouth once daily. (Patient not taking: Reported on 2023), Disp: , Rfl: 0    gabapentin (NEURONTIN) 600 MG tablet, Take 1 tablet (600 mg  "total) by mouth 3 (three) times a day for 90 days. (Patient not taking: Reported on 2023), Disp: 270 tablet, Rfl: 0    omeprazole (PRILOSEC) 20 MG capsule, 20 mg once daily., Disp: , Rfl:     polyethylene glycol (GLYCOLAX) 17 gram PwPk, Take 17 g by mouth 2 (two) times daily as needed (constipation). (Patient not taking: Reported on 2023), Disp: , Rfl: 0    polyethylene glycol (GOLYTELY) 236-22.74-6.74 -5.86 gram suspension, Take as directed (Patient not taking: Reported on 2023), Disp: 4000 mL, Rfl: 0    sildenafiL (VIAGRA) 100 MG tablet, Take 100 mg by mouth as needed for Erectile Dysfunction., Disp: , Rfl:     traZODone (DESYREL) 100 MG tablet, Take 1 tablet (100 mg total) by mouth nightly for 60 days. (Patient not taking: Reported on 2023), Disp: 60 tablet, Rfl: 0  Blood pressure 119/83, pulse 74, resp. rate 18, height 5' 6" (1.676 m), weight 71.2 kg (157 lb).      Neurologic Exam     Mental Status   Oriented to person, place, and time.   Attention: normal. Concentration: normal.   Speech: speech is normal   Level of consciousness: alert  Knowledge: good.   Normal comprehension.     Cranial Nerves     CN III, IV, VI   Extraocular motions are normal.     CN VII   Facial expression full, symmetric.     CN VIII   Hearing: intact    CN XI   Right trapezius strength: normal  Left trapezius strength: normal    Motor Exam   Muscle bulk: normal  Overall muscle tone: normal    Strength   Right deltoid: 5/5  Left deltoid: 5/5  Right biceps: 5/5  Left biceps: 5/5  Right triceps: 5/5  Left triceps: 5/5  Right wrist flexion: 5/5  Left wrist flexion: 5/5  Right wrist extension: 5/5  Left wrist extension: 5/5  Right interossei: 5/5  Left interossei: 5/5  Right iliopsoas: 5/5  Left iliopsoas: 5/5  Right quadriceps: 5/5  Left quadriceps: 5/5  Right hamstrin/5  Left hamstrin/5  Right anterior tibial: 5/5  Left anterior tibial: 5/5  Right posterior tibial: 5/5  Left posterior tibial: 5/5  Right " "peroneal: 5/5  Left peroneal: 5/5  Right gastroc: 5/5  Left gastroc: 5/5         Sensory Exam   Light touch normal.   Right arm light touch: normal  Left arm light touch: normal  Right leg light touch: normal  Left leg light touch: normal    Gait, Coordination, and Reflexes     Tremor   Resting tremor: absent  Intention tremor: absent  Action tremor: absent    Reflexes   Right brachioradialis: 2+  Left brachioradialis: 2+  Right biceps: 2+  Left biceps: 2+  Right triceps: 2+  Left triceps: 2+  Right patellar: 2+  Left patellar: 2+  Right achilles: 2+  Left achilles: 2+  Right Jordan: absent  Left Jordan: present (trace)  Right ankle clonus: present (2 beats)  Left ankle clonus: present (2 beats)         Physical Exam  Eyes:      Extraocular Movements: EOM normal.   Musculoskeletal:      Comments:   Right greater trochanter nontender. Significant pain with internal and external rotation of the right hip.    Right elbow positive Tinel's cubital tunnel and positive at carpal tunnel as well.    Neurological:      General: No focal deficit present.      Mental Status: He is alert and oriented to person, place, and time.      Deep Tendon Reflexes:      Reflex Scores:       Tricep reflexes are 2+ on the right side and 2+ on the left side.       Bicep reflexes are 2+ on the right side and 2+ on the left side.       Brachioradialis reflexes are 2+ on the right side and 2+ on the left side.       Patellar reflexes are 2+ on the right side and 2+ on the left side.       Achilles reflexes are 2+ on the right side and 2+ on the left side.  Psychiatric:         Speech: Speech normal.         Vital Signs  Pulse: 74  Resp: 18  BP: 119/83  BP Location: Left arm  Patient Position: Sitting  Pain Score:   7  Pain Loc: Neck  Height and Weight  Height: 5' 6" (167.6 cm)  Weight: 71.2 kg (157 lb)  BSA (Calculated - sq m): 1.82 sq meters  BMI (Calculated): 25.4  Weight in (lb) to have BMI = 25: 154.6]    Provider dictation:  CT cervical " spine dated 11/14/23 is reviewed personally and with Dr. Bah and discussed with the patient. He has adequate bony fusion throughout the C2-T2 construct.      Patient will continue to follow up with orthopedics for his hip pathology; with decent relief of pain from his hip injection and none from his lumbar injection, the hip is likely the source. We discussed his ulnar nerve pain, encouraged to avoid direct pressure and monitor. May follow up with hand surgeon if persists.     He will return to our clinic as needed. All questions were answered.     Visit Diagnosis:  S/P cervical spinal fusion

## 2023-12-20 DIAGNOSIS — M16.11 OSTEOARTHRITIS OF RIGHT HIP, UNSPECIFIED OSTEOARTHRITIS TYPE: Primary | ICD-10-CM

## 2023-12-22 ENCOUNTER — HOSPITAL ENCOUNTER (OUTPATIENT)
Dept: RADIOLOGY | Facility: HOSPITAL | Age: 58
Discharge: HOME | End: 2023-12-22
Attending: ORTHOPAEDIC SURGERY
Payer: MEDICARE

## 2023-12-22 ENCOUNTER — OFFICE VISIT (OUTPATIENT)
Dept: ORTHOPEDICS | Facility: CLINIC | Age: 58
End: 2023-12-22
Payer: MEDICARE

## 2023-12-22 DIAGNOSIS — M25.551 RIGHT HIP PAIN: Primary | ICD-10-CM

## 2023-12-22 DIAGNOSIS — M16.11 OSTEOARTHRITIS OF RIGHT HIP, UNSPECIFIED OSTEOARTHRITIS TYPE: ICD-10-CM

## 2023-12-22 PROCEDURE — 99999 PR PBB SHADOW E&M-EST. PATIENT-LVL II: ICD-10-PCS | Mod: PBBFAC,,, | Performed by: ORTHOPAEDIC SURGERY

## 2023-12-22 PROCEDURE — 73502 X-RAY EXAM HIP UNI 2-3 VIEWS: CPT | Mod: 26,RT,, | Performed by: RADIOLOGY

## 2023-12-22 PROCEDURE — 99215 OFFICE O/P EST HI 40 MIN: CPT | Mod: 57,S$GLB,, | Performed by: ORTHOPAEDIC SURGERY

## 2023-12-22 PROCEDURE — 1159F PR MEDICATION LIST DOCUMENTED IN MEDICAL RECORD: ICD-10-PCS | Mod: CPTII,S$GLB,, | Performed by: ORTHOPAEDIC SURGERY

## 2023-12-22 PROCEDURE — 73502 X-RAY EXAM HIP UNI 2-3 VIEWS: CPT | Mod: TC,PO,RT

## 2023-12-22 PROCEDURE — 73502 XR HIP WITH PELVIS WHEN PERFORMED, 2 OR 3  VIEWS RIGHT: ICD-10-PCS | Mod: 26,RT,, | Performed by: RADIOLOGY

## 2023-12-22 PROCEDURE — 99215 PR OFFICE/OUTPT VISIT, EST, LEVL V, 40-54 MIN: ICD-10-PCS | Mod: 57,S$GLB,, | Performed by: ORTHOPAEDIC SURGERY

## 2023-12-22 PROCEDURE — 1159F MED LIST DOCD IN RCRD: CPT | Mod: CPTII,S$GLB,, | Performed by: ORTHOPAEDIC SURGERY

## 2023-12-22 PROCEDURE — 99999 PR PBB SHADOW E&M-EST. PATIENT-LVL II: CPT | Mod: PBBFAC,,, | Performed by: ORTHOPAEDIC SURGERY

## 2023-12-22 PROCEDURE — 3044F HG A1C LEVEL LT 7.0%: CPT | Mod: CPTII,S$GLB,, | Performed by: ORTHOPAEDIC SURGERY

## 2023-12-22 PROCEDURE — 3044F PR MOST RECENT HEMOGLOBIN A1C LEVEL <7.0%: ICD-10-PCS | Mod: CPTII,S$GLB,, | Performed by: ORTHOPAEDIC SURGERY

## 2023-12-22 NOTE — PROGRESS NOTES
58 years old debilitating pain of the right hip that has failed a longstanding nonoperative course.  He had received a Kenalog injection about 6 months ago which gave him relief for about a week's time.  Pain is crippling he has failed a nonoperative course.  He is done well with left hip replacement by myself several years ago interested in hip replacement surgery     Exam shows internal external rotation of the right hip is limited and painful for him and reproduce his symptoms    X-rays show arthritic changes of the hip     Assessment: Right hip arthrosis     Plan:  We will schedule the patient for right hip replacement surgery, he is aware the risks and limitations of surgery and still wants to proceed

## 2024-01-08 ENCOUNTER — TELEPHONE (OUTPATIENT)
Dept: FAMILY MEDICINE | Facility: CLINIC | Age: 59
End: 2024-01-08
Payer: MEDICARE

## 2024-01-08 DIAGNOSIS — M25.551 RIGHT HIP PAIN: Primary | ICD-10-CM

## 2024-01-08 DIAGNOSIS — Z96.641 S/P TOTAL RIGHT HIP ARTHROPLASTY: ICD-10-CM

## 2024-01-08 DIAGNOSIS — M16.11 OSTEOARTHRITIS OF RIGHT HIP: ICD-10-CM

## 2024-01-08 RX ORDER — MUPIROCIN 20 MG/G
OINTMENT TOPICAL
Status: CANCELLED | OUTPATIENT
Start: 2024-01-08

## 2024-01-08 RX ORDER — CEFAZOLIN SODIUM 2 G/50ML
2 SOLUTION INTRAVENOUS
Status: CANCELLED | OUTPATIENT
Start: 2024-01-08

## 2024-01-08 NOTE — TELEPHONE ENCOUNTER
Patient scheduled for 1/24, incase surgery is on 2/6/24. It may be actually later but will let us know.

## 2024-01-08 NOTE — TELEPHONE ENCOUNTER
----- Message from Vicki Palacios sent at 1/8/2024  1:02 PM CST -----  Regarding: pre op appt  Contact: pt  Type:  Needs Medical Advice    Who Called: pt    Would the patient rather a call back or a response via [facility]? Call back  Best Call Back Number: [phone]    Additional Information: sts he wants to schedule a pre op appt

## 2024-01-24 ENCOUNTER — OFFICE VISIT (OUTPATIENT)
Dept: FAMILY MEDICINE | Facility: CLINIC | Age: 59
End: 2024-01-24
Payer: MEDICARE

## 2024-01-24 ENCOUNTER — HOSPITAL ENCOUNTER (OUTPATIENT)
Dept: RADIOLOGY | Facility: HOSPITAL | Age: 59
Discharge: HOME | End: 2024-01-24
Attending: NURSE PRACTITIONER
Payer: MEDICARE

## 2024-01-24 VITALS
BODY MASS INDEX: 26.68 KG/M2 | WEIGHT: 166 LBS | HEART RATE: 86 BPM | HEIGHT: 66 IN | OXYGEN SATURATION: 95 % | SYSTOLIC BLOOD PRESSURE: 122 MMHG | DIASTOLIC BLOOD PRESSURE: 86 MMHG | TEMPERATURE: 98 F

## 2024-01-24 DIAGNOSIS — Z01.818 PREOPERATIVE CLEARANCE: Primary | ICD-10-CM

## 2024-01-24 DIAGNOSIS — R82.90 BAD ODOR OF URINE: ICD-10-CM

## 2024-01-24 DIAGNOSIS — Z01.818 PREOPERATIVE CLEARANCE: ICD-10-CM

## 2024-01-24 LAB
BILIRUB UR QL STRIP: NEGATIVE
CLARITY UR: CLEAR
COLOR UR: YELLOW
GLUCOSE UR QL STRIP: NEGATIVE
HGB UR QL STRIP: NEGATIVE
KETONES UR QL STRIP: NEGATIVE
LEUKOCYTE ESTERASE UR QL STRIP: NEGATIVE
NITRITE UR QL STRIP: NEGATIVE
PH UR STRIP: 6 [PH] (ref 5–8)
PROT UR QL STRIP: NEGATIVE
SP GR UR STRIP: <=1.005 (ref 1–1.03)
URN SPEC COLLECT METH UR: ABNORMAL

## 2024-01-24 PROCEDURE — 93010 ELECTROCARDIOGRAM REPORT: CPT | Mod: HCNC,S$GLB,, | Performed by: INTERNAL MEDICINE

## 2024-01-24 PROCEDURE — 81003 URINALYSIS AUTO W/O SCOPE: CPT | Mod: HCNC,PO | Performed by: NURSE PRACTITIONER

## 2024-01-24 PROCEDURE — 3008F BODY MASS INDEX DOCD: CPT | Mod: HCNC,CPTII,S$GLB, | Performed by: NURSE PRACTITIONER

## 2024-01-24 PROCEDURE — 71046 X-RAY EXAM CHEST 2 VIEWS: CPT | Mod: 26,HCNC,, | Performed by: RADIOLOGY

## 2024-01-24 PROCEDURE — 3074F SYST BP LT 130 MM HG: CPT | Mod: HCNC,CPTII,S$GLB, | Performed by: NURSE PRACTITIONER

## 2024-01-24 PROCEDURE — 93005 ELECTROCARDIOGRAM TRACING: CPT | Mod: HCNC,S$GLB,, | Performed by: NURSE PRACTITIONER

## 2024-01-24 PROCEDURE — 99214 OFFICE O/P EST MOD 30 MIN: CPT | Mod: HCNC,S$GLB,, | Performed by: NURSE PRACTITIONER

## 2024-01-24 PROCEDURE — 71046 X-RAY EXAM CHEST 2 VIEWS: CPT | Mod: TC,HCNC,FY,PO

## 2024-01-24 PROCEDURE — 99999 PR PBB SHADOW E&M-EST. PATIENT-LVL III: CPT | Mod: PBBFAC,HCNC,, | Performed by: NURSE PRACTITIONER

## 2024-01-24 PROCEDURE — 3079F DIAST BP 80-89 MM HG: CPT | Mod: HCNC,CPTII,S$GLB, | Performed by: NURSE PRACTITIONER

## 2024-01-24 NOTE — PROGRESS NOTES
Subjective:       Patient ID: Wild Galeano is a 58 y.o. male.    Chief Complaint: Pre-op Exam    HPI  Patient presents for preoperative clearance    Reports foul smelling urine last week--improved with increased hydration. Denies dysuria, hematuria     HTN - controlled off meds      5/3 had cervical surgery - fusion and kate.  decreased ROM but doing well  No complications with anesthesia      Smoked > 1 PPD for 20 yr.  (44 yr 1-2 ppd).  DC 10/2022    Denies CP, SOB, presyncope or syncope     Vitals:    01/24/24 0924   BP: 122/86   Pulse: 86   Temp: 97.7 °F (36.5 °C)     Review of Systems   Constitutional:  Negative for fever.   Respiratory:  Negative for cough and shortness of breath.    Cardiovascular:  Negative for chest pain and leg swelling.   Neurological:  Negative for dizziness, syncope and light-headedness.       Past Medical History:   Diagnosis Date    Cervical spinal stenosis     General anesthetics causing adverse effect in therapeutic use     Hypertension     Prediabetes     PTSD (post-traumatic stress disorder)     Smoker      Objective:      Physical Exam  Vitals and nursing note reviewed.   Constitutional:       General: He is not in acute distress.     Appearance: He is not diaphoretic.   HENT:      Head: Normocephalic.   Eyes:      General: Lids are normal.         Right eye: No discharge.         Left eye: No discharge.   Neck:      Trachea: No tracheal deviation.   Cardiovascular:      Rate and Rhythm: Normal rate and regular rhythm.      Heart sounds: Normal heart sounds.   Pulmonary:      Effort: Pulmonary effort is normal.      Breath sounds: Normal breath sounds.   Musculoskeletal:      Right lower leg: No edema.      Left lower leg: No edema.   Skin:     Coloration: Skin is not pale.   Neurological:      Mental Status: He is alert and oriented to person, place, and time.   Psychiatric:         Speech: Speech normal.         Behavior: Behavior normal.         Thought Content: Thought  content normal.         Judgment: Judgment normal.         Assessment:       1. Preoperative clearance    2. Bad odor of urine        Plan:       Preoperative clearance  -     EKG 12-lead; Future  -     X-Ray Chest PA And Lateral; Future; Expected date: 01/24/2024  -     CBC Auto Differential; Future; Expected date: 01/24/2024  -     Comprehensive Metabolic Panel; Future; Expected date: 01/24/2024    Bad odor of urine  -     Urinalysis, Reflex to Urine Culture; Future; Expected date: 01/24/2024          Workup reviewed--acceptable. Cleared for surgery       Medication List with Changes/Refills   Discontinued Medications    ACETAMINOPHEN (TYLENOL) 325 MG TABLET    Take 650 mg by mouth every 6 (six) hours as needed for Pain.    AMLODIPINE (NORVASC) 5 MG TABLET    Take 1 tablet (5 mg total) by mouth in the morning for 90 days.    BISACODYL (DULCOLAX) 5 MG EC TABLET    Take as directed    CYANOCOBALAMIN 1,000 MCG/ML INJECTION    Inject 1,000 mcg into the muscle every 30 days.    CYCLOBENZAPRINE (FLEXERIL) 10 MG TABLET    Take 1 tablet (10 mg total) by mouth every 12 (twelve) hours as needed for muscle spasms/pain.    DOCUSATE SODIUM (COLACE) 100 MG CAPSULE    Take 2 capsules (200 mg total) by mouth once daily.    GABAPENTIN (NEURONTIN) 600 MG TABLET    Take 1 tablet (600 mg total) by mouth 3 (three) times a day for 90 days.    OMEPRAZOLE (PRILOSEC) 20 MG CAPSULE    20 mg once daily.    POLYETHYLENE GLYCOL (GLYCOLAX) 17 GRAM PWPK    Take 17 g by mouth 2 (two) times daily as needed (constipation).    POLYETHYLENE GLYCOL (GOLYTELY) 236-22.74-6.74 -5.86 GRAM SUSPENSION    Take as directed    SILDENAFIL (VIAGRA) 100 MG TABLET    Take 100 mg by mouth as needed for Erectile Dysfunction.    TRAZODONE (DESYREL) 100 MG TABLET    Take 1 tablet (100 mg total) by mouth nightly for 60 days.

## 2024-02-02 ENCOUNTER — TELEPHONE (OUTPATIENT)
Dept: ORTHOPEDICS | Facility: CLINIC | Age: 59
End: 2024-02-02
Payer: MEDICARE

## 2024-02-02 NOTE — TELEPHONE ENCOUNTER
Spoke with loi mireles [facility] who stated per Dr Dennis pt need bactroban ointment (intranasal) BID daily for surgery on 2/6 called into pharmacy.   I spoke with pt and informed him to apply bactroban ointment in each nare twice a day until surgery. Pt request I called rx here at [facility] in [place].   I spoke pharmacist her and called in rx for pt.

## 2024-02-05 ENCOUNTER — TELEPHONE (OUTPATIENT)
Dept: ORTHOPEDICS | Facility: CLINIC | Age: 59
End: 2024-02-05
Payer: MEDICARE

## 2024-02-05 DIAGNOSIS — Z96.641 S/P TOTAL RIGHT HIP ARTHROPLASTY: Primary | ICD-10-CM

## 2024-02-05 RX ORDER — OXYCODONE AND ACETAMINOPHEN 5; 325 MG/1; MG/1
1 TABLET ORAL
Qty: 42 TABLET | Refills: 0 | Status: SHIPPED | OUTPATIENT
Start: 2024-02-05

## 2024-02-05 RX ORDER — WARFARIN SODIUM 5 MG/1
5 TABLET ORAL DAILY
Qty: 30 TABLET | Refills: 11 | Status: SHIPPED | OUTPATIENT
Start: 2024-02-05 | End: 2025-02-04

## 2024-02-05 RX ORDER — CEPHALEXIN 500 MG/1
500 CAPSULE ORAL EVERY 6 HOURS
Qty: 20 CAPSULE | Refills: 0 | Status: SHIPPED | OUTPATIENT
Start: 2024-02-05

## 2024-02-05 NOTE — TELEPHONE ENCOUNTER
Contacted patient. Informed him he can  the Coumadin at 3:30 pm at the pharmacy he has on file. Patient verbalized understanding.

## 2024-02-05 NOTE — TELEPHONE ENCOUNTER
----- Message from Pradeep Montes MA sent at 2/5/2024 10:59 AM CST -----  Contact: patient  Patient is having surgery tomorrow at [facility] but did not get his Coumadin Rx yet.      [facility] [address] [facility] [address] [place] [address]  Phone: [phone] Fax: [phone]    Call back number is [phone]

## 2024-02-05 NOTE — TELEPHONE ENCOUNTER
Contacted Isabelle from [facility]. Auth # was provided to Isabelle. Mr. Rome is approved for surgery. She verbalized understanding.

## 2024-02-05 NOTE — TELEPHONE ENCOUNTER
----- Message from Pradeep Montes MA sent at 2/5/2024  9:12 AM CST -----  Contact: Isabelle/[facility]  Wants to see if [facility] has approved patients MAYI that is scheduled for tomorrow 2/6/24?    Call back number is [phone]

## 2024-02-06 ENCOUNTER — OUTSIDE PLACE OF SERVICE (OUTPATIENT)
Dept: ORTHOPEDICS | Facility: CLINIC | Age: 59
End: 2024-02-06
Payer: MEDICARE

## 2024-02-06 PROCEDURE — 27130 TOTAL HIP ARTHROPLASTY: CPT | Mod: RT,,, | Performed by: ORTHOPAEDIC SURGERY

## 2024-02-08 ENCOUNTER — OUTSIDE PLACE OF SERVICE (OUTPATIENT)
Dept: ORTHOPEDICS | Facility: CLINIC | Age: 59
End: 2024-02-08
Payer: MEDICARE

## 2024-02-08 PROCEDURE — 99024 POSTOP FOLLOW-UP VISIT: CPT | Mod: S$GLB,,, | Performed by: ORTHOPAEDIC SURGERY

## 2024-02-09 ENCOUNTER — OUTSIDE PLACE OF SERVICE (OUTPATIENT)
Dept: ORTHOPEDICS | Facility: CLINIC | Age: 59
End: 2024-02-09
Payer: MEDICARE

## 2024-02-09 PROCEDURE — 99024 POSTOP FOLLOW-UP VISIT: CPT | Mod: S$GLB,,, | Performed by: ORTHOPAEDIC SURGERY

## 2024-02-11 ENCOUNTER — OUTSIDE PLACE OF SERVICE (OUTPATIENT)
Dept: ORTHOPEDICS | Facility: CLINIC | Age: 59
End: 2024-02-11
Payer: MEDICARE

## 2024-02-11 PROCEDURE — 99024 POSTOP FOLLOW-UP VISIT: CPT | Mod: S$GLB,,, | Performed by: ORTHOPAEDIC SURGERY

## 2024-02-14 ENCOUNTER — OUTSIDE PLACE OF SERVICE (OUTPATIENT)
Dept: ORTHOPEDICS | Facility: CLINIC | Age: 59
End: 2024-02-14
Payer: MEDICARE

## 2024-02-14 PROCEDURE — 99024 POSTOP FOLLOW-UP VISIT: CPT | Mod: S$GLB,,, | Performed by: ORTHOPAEDIC SURGERY

## 2024-02-15 ENCOUNTER — TELEPHONE (OUTPATIENT)
Dept: ORTHOPEDICS | Facility: CLINIC | Age: 59
End: 2024-02-15
Payer: MEDICARE

## 2024-02-15 NOTE — TELEPHONE ENCOUNTER
----- Message from Marica Leiva MA sent at 2/15/2024 10:07 AM CST -----  Contact: wife,  Demanding to speak to office, hung up 1st time   Wants order for transfer to rehab care, its on pause   Call back    States she will stop by office today.  Very rude,

## 2024-02-22 ENCOUNTER — OFFICE VISIT (OUTPATIENT)
Dept: ORTHOPEDICS | Facility: CLINIC | Age: 59
End: 2024-02-22
Payer: MEDICARE

## 2024-02-22 VITALS — WEIGHT: 166 LBS | BODY MASS INDEX: 26.68 KG/M2 | HEIGHT: 66 IN

## 2024-02-22 DIAGNOSIS — Z96.641 S/P TOTAL RIGHT HIP ARTHROPLASTY: Primary | ICD-10-CM

## 2024-02-22 PROCEDURE — 1159F MED LIST DOCD IN RCRD: CPT | Mod: HCNC,CPTII,S$GLB, | Performed by: ORTHOPAEDIC SURGERY

## 2024-02-22 PROCEDURE — 99999 PR PBB SHADOW E&M-EST. PATIENT-LVL II: CPT | Mod: PBBFAC,HCNC,, | Performed by: ORTHOPAEDIC SURGERY

## 2024-02-22 PROCEDURE — 99024 POSTOP FOLLOW-UP VISIT: CPT | Mod: HCNC,S$GLB,, | Performed by: ORTHOPAEDIC SURGERY

## 2024-02-22 NOTE — PROGRESS NOTES
2 weeks post arthroplasty.  Doing well.  Wound without signs of infection.  Skin closure device  removed.  Continue with DVT prophylaxis and physical therapy.  Follow up in 4 weeks with x-rays.    Patient's wife very unhappy with the care he received postoperatively in the hospital.  He was in the hospital for over a week's time had difficulty being transferred to a rehab facility that they have requested due to the fact that he would ambulated 200 ft in the early postoperative.  We did make a special peer to peer phone call to get him transferred into a skilled unit where he still resides but now is interested in being transferred with home health therapy with medical equipment requesting hospital bed bedside commode and a walker.    Patient seems to be doing well from surgery again wife very unhappy with postoperative care.  She has a very lengthy explanation as to why he has problems in the postoperative period and has suggested remedies for this.  Patient's wife also still upset by the care that he received postoperatively in 2017 from hip replacement that we would performed    I do not feel that I would be the best person to care for these patients in the future

## 2024-02-24 PROCEDURE — G0180 MD CERTIFICATION HHA PATIENT: HCPCS | Mod: ,,, | Performed by: ORTHOPAEDIC SURGERY

## 2024-03-04 ENCOUNTER — TELEPHONE (OUTPATIENT)
Dept: ORTHOPEDICS | Facility: CLINIC | Age: 59
End: 2024-03-04
Payer: MEDICARE

## 2024-03-04 DIAGNOSIS — M25.559 HIP PAIN, UNSPECIFIED LATERALITY: Primary | ICD-10-CM

## 2024-03-04 RX ORDER — CYCLOBENZAPRINE HCL 10 MG
10 TABLET ORAL 3 TIMES DAILY PRN
Qty: 22 TABLET | Refills: 0 | Status: SHIPPED | OUTPATIENT
Start: 2024-03-04 | End: 2024-03-14

## 2024-03-04 RX ORDER — OXYCODONE AND ACETAMINOPHEN 7.5; 325 MG/1; MG/1
1 TABLET ORAL EVERY 4 HOURS PRN
Qty: 22 TABLET | Refills: 0 | Status: SHIPPED | OUTPATIENT
Start: 2024-03-04

## 2024-03-04 NOTE — TELEPHONE ENCOUNTER
----- Message from Adele Everton sent at 3/4/2024 10:06 AM CST -----  Regarding: refill  Contact: patient  Type:  RX Refill Request    Who Called:  patient  Refill or New Rx:  refill  RX Name and Strength:  oxyCODONE-acetaminophen (PERCOCET) 7.5-325 mg per tablet  Methocarbamol  How is the patient currently taking it? (ex. 1XDay):  as directed  Is this a 30 day or 90 day RX:  30  Preferred Pharmacy with phone number:    [facility]  [address] [facility] [address] [place] [address]  Phone: [phone] Fax: [phone]      Local or Mail Order:  local  Ordering Provider:  Dr. Jeannie Lew Call Back Number:  [phone] (home)     Additional Information:  Please call patient to advise.,  thanks!

## 2024-03-04 NOTE — TELEPHONE ENCOUNTER
I informed pt Dr Dennis wrote another rx for percocet.   Pt wants me to walk it down the the pharmacy and also wants Dr Dennis to write a rx for a muscle relaxer. Dr Dennis sent it in.  Pt notified

## 2024-03-08 ENCOUNTER — TELEPHONE (OUTPATIENT)
Dept: ORTHOPEDICS | Facility: CLINIC | Age: 59
End: 2024-03-08
Payer: MEDICARE

## 2024-03-08 DIAGNOSIS — Z96.641 S/P TOTAL RIGHT HIP ARTHROPLASTY: Primary | ICD-10-CM

## 2024-03-08 DIAGNOSIS — M25.551 RIGHT HIP PAIN: ICD-10-CM

## 2024-03-08 DIAGNOSIS — M25.559 HIP PAIN, UNSPECIFIED LATERALITY: ICD-10-CM

## 2024-03-08 RX ORDER — OXYCODONE AND ACETAMINOPHEN 7.5; 325 MG/1; MG/1
TABLET ORAL
Qty: 30 TABLET | Refills: 0 | Status: SHIPPED | OUTPATIENT
Start: 2024-03-08 | End: 2024-03-11 | Stop reason: SDUPTHER

## 2024-03-08 NOTE — TELEPHONE ENCOUNTER
----- Message from Lenora Eduardo sent at 3/8/2024  3:59 PM CST -----  Contact: pt  Type:  RX Refill Request    Who Called: pt    Refill or New Rx: refill    RX Name and Strength:oxyCODONE-acetaminophen (PERCOCET) 7.5-325 mg per tablet    How is the patient currently taking it? (ex. 1XDay):as directed    Is this a 30 day or 90 day RX:30    Preferred Pharmacy with phone number:    [facility] [address] [place] [address]  Phone: [phone] Fax: [phone]      Local or Mail Order:local    Ordering Provider: Jeannie    Would the patient rather a call back or a response via [facility]? Call back    Best Call Back Number:[phone]    Additional Information: please refill above script

## 2024-03-08 NOTE — TELEPHONE ENCOUNTER
----- Message from Geraldineaudrey Hinton sent at 3/8/2024 10:31 AM CST -----  Contact: Self  Type:  RX Refill Request    Who Called:  Patient  Refill or New Rx:  Refill - he has today left but needs it until Monday's apt w pain management   RX Name and Strength:   oxyCODONE-acetaminophen (PERCOCET) 7.5-325 mg per tablet  How is the patient currently taking it? (ex. 1XDay):  Take 1 tablet by mouth every 4 (four) hours as needed for Pain. - Oral   Is this a 30 day or 90 day RX:   22 tablet 0   Preferred Pharmacy with phone number:    [facility] [address] [place] [address]  Phone: [phone] Fax: [phone]  Local or Mail Order:  Local  Ordering Provider:  Jeannie Lew Call Back Number:   [phone]  Additional Information:  Please call the patient back at the phone number listed above to advise. Thank you!

## 2024-03-18 ENCOUNTER — EXTERNAL HOME HEALTH (OUTPATIENT)
Dept: HOME HEALTH SERVICES | Facility: HOSPITAL | Age: 59
End: 2024-03-18
Payer: MEDICARE

## 2024-03-18 DIAGNOSIS — M24.851 RIGHT HIP CREPITUS: Primary | ICD-10-CM

## 2024-03-19 ENCOUNTER — DOCUMENT SCAN (OUTPATIENT)
Dept: HOME HEALTH SERVICES | Facility: HOSPITAL | Age: 59
End: 2024-03-19
Payer: MEDICARE

## 2024-03-20 ENCOUNTER — OFFICE VISIT (OUTPATIENT)
Dept: ORTHOPEDICS | Facility: CLINIC | Age: 59
End: 2024-03-20
Payer: MEDICARE

## 2024-03-20 ENCOUNTER — HOSPITAL ENCOUNTER (OUTPATIENT)
Dept: RADIOLOGY | Facility: HOSPITAL | Age: 59
Discharge: HOME | End: 2024-03-20
Attending: ORTHOPAEDIC SURGERY
Payer: MEDICARE

## 2024-03-20 ENCOUNTER — DOCUMENT SCAN (OUTPATIENT)
Dept: HOME HEALTH SERVICES | Facility: HOSPITAL | Age: 59
End: 2024-03-20
Payer: MEDICARE

## 2024-03-20 VITALS — BODY MASS INDEX: 26.68 KG/M2 | HEIGHT: 66 IN | WEIGHT: 166 LBS

## 2024-03-20 DIAGNOSIS — M24.851 RIGHT HIP CREPITUS: ICD-10-CM

## 2024-03-20 DIAGNOSIS — Z96.641 S/P TOTAL RIGHT HIP ARTHROPLASTY: Primary | ICD-10-CM

## 2024-03-20 PROCEDURE — 1159F MED LIST DOCD IN RCRD: CPT | Mod: CPTII,S$GLB,, | Performed by: ORTHOPAEDIC SURGERY

## 2024-03-20 PROCEDURE — 99024 POSTOP FOLLOW-UP VISIT: CPT | Mod: S$GLB,,, | Performed by: ORTHOPAEDIC SURGERY

## 2024-03-20 PROCEDURE — 99999 PR PBB SHADOW E&M-EST. PATIENT-LVL III: CPT | Mod: PBBFAC,,, | Performed by: ORTHOPAEDIC SURGERY

## 2024-03-20 PROCEDURE — 73502 X-RAY EXAM HIP UNI 2-3 VIEWS: CPT | Mod: TC,PO,RT

## 2024-03-20 PROCEDURE — 73502 X-RAY EXAM HIP UNI 2-3 VIEWS: CPT | Mod: 26,RT,, | Performed by: RADIOLOGY

## 2024-03-20 NOTE — PROGRESS NOTES
Six weeks post arthroplasty.  Doing well.  No signs of infection.  Good range of motion and stability.  X - rays look good.  Plan: Activities to tolerance, OK to discontinue dvt prophylaxis from orthopedic standpoint.  Follow up in six weeks with x-rays.

## 2024-04-09 ENCOUNTER — DOCUMENT SCAN (OUTPATIENT)
Dept: HOME HEALTH SERVICES | Facility: HOSPITAL | Age: 59
End: 2024-04-09
Payer: MEDICARE

## 2024-04-15 ENCOUNTER — DOCUMENT SCAN (OUTPATIENT)
Dept: HOME HEALTH SERVICES | Facility: HOSPITAL | Age: 59
End: 2024-04-15
Payer: MEDICARE

## 2024-05-02 ENCOUNTER — DOCUMENT SCAN (OUTPATIENT)
Dept: HOME HEALTH SERVICES | Facility: HOSPITAL | Age: 59
End: 2024-05-02
Payer: MEDICARE

## 2024-05-15 DIAGNOSIS — R73.03 PREDIABETES: ICD-10-CM

## 2024-09-25 DIAGNOSIS — Z00.00 ENCOUNTER FOR MEDICARE ANNUAL WELLNESS EXAM: ICD-10-CM

## 2024-10-31 ENCOUNTER — TELEPHONE (OUTPATIENT)
Dept: ADMINISTRATIVE | Facility: CLINIC | Age: 59
End: 2024-10-31
Payer: MEDICARE

## 2024-11-01 ENCOUNTER — OFFICE VISIT (OUTPATIENT)
Dept: FAMILY MEDICINE | Facility: CLINIC | Age: 59
End: 2024-11-01
Payer: MEDICARE

## 2024-11-01 VITALS
SYSTOLIC BLOOD PRESSURE: 130 MMHG | WEIGHT: 171.5 LBS | BODY MASS INDEX: 27.56 KG/M2 | OXYGEN SATURATION: 98 % | HEIGHT: 66 IN | HEART RATE: 96 BPM | DIASTOLIC BLOOD PRESSURE: 76 MMHG

## 2024-11-01 DIAGNOSIS — S14.129D CENTRAL CORD SYNDROME, SUBSEQUENT ENCOUNTER: ICD-10-CM

## 2024-11-01 DIAGNOSIS — Z00.00 ENCOUNTER FOR PREVENTIVE HEALTH EXAMINATION: Primary | ICD-10-CM

## 2024-11-01 DIAGNOSIS — M50.00 CERVICAL DISC DISEASE WITH MYELOPATHY: ICD-10-CM

## 2024-11-01 DIAGNOSIS — R73.03 PREDIABETES: ICD-10-CM

## 2024-11-01 DIAGNOSIS — Z00.00 ENCOUNTER FOR MEDICARE ANNUAL WELLNESS EXAM: ICD-10-CM

## 2024-11-01 DIAGNOSIS — F12.20 MARIJUANA DEPENDENCE: ICD-10-CM

## 2024-11-01 DIAGNOSIS — Z87.891 PERSONAL HISTORY OF NICOTINE DEPENDENCE: ICD-10-CM

## 2024-11-01 DIAGNOSIS — Z13.6 ENCOUNTER FOR SCREENING FOR CARDIOVASCULAR DISORDERS: ICD-10-CM

## 2024-11-01 PROBLEM — E87.1 HYPONATREMIA: Status: RESOLVED | Noted: 2023-05-05 | Resolved: 2024-11-01

## 2024-11-01 PROCEDURE — 99999 PR PBB SHADOW E&M-EST. PATIENT-LVL IV: CPT | Mod: PBBFAC,HCNC,, | Performed by: NURSE PRACTITIONER

## 2024-11-22 ENCOUNTER — HOSPITAL ENCOUNTER (OUTPATIENT)
Dept: RADIOLOGY | Facility: HOSPITAL | Age: 59
Discharge: HOME | End: 2024-11-22
Attending: NURSE PRACTITIONER
Payer: MEDICARE

## 2024-11-22 DIAGNOSIS — Z00.00 ENCOUNTER FOR PREVENTIVE HEALTH EXAMINATION: ICD-10-CM

## 2024-11-22 DIAGNOSIS — Z87.891 PERSONAL HISTORY OF NICOTINE DEPENDENCE: ICD-10-CM

## 2024-11-22 PROBLEM — R91.8 LUNG NODULES: Status: ACTIVE | Noted: 2024-11-22

## 2024-11-22 PROCEDURE — 71271 CT THORAX LUNG CANCER SCR C-: CPT | Mod: 26,HCNC,, | Performed by: RADIOLOGY

## 2024-11-22 PROCEDURE — 71271 CT THORAX LUNG CANCER SCR C-: CPT | Mod: TC,HCNC,PO

## 2024-11-25 ENCOUNTER — TELEPHONE (OUTPATIENT)
Dept: FAMILY MEDICINE | Facility: CLINIC | Age: 59
End: 2024-11-25
Payer: MEDICARE

## 2024-11-25 ENCOUNTER — OFFICE VISIT (OUTPATIENT)
Dept: FAMILY MEDICINE | Facility: CLINIC | Age: 59
End: 2024-11-25
Payer: MEDICARE

## 2024-11-25 VITALS
SYSTOLIC BLOOD PRESSURE: 136 MMHG | WEIGHT: 170.63 LBS | BODY MASS INDEX: 27.54 KG/M2 | DIASTOLIC BLOOD PRESSURE: 88 MMHG | HEART RATE: 93 BPM | OXYGEN SATURATION: 98 %

## 2024-11-25 DIAGNOSIS — R91.1 LUNG NODULE: ICD-10-CM

## 2024-11-25 DIAGNOSIS — R91.1 SOLITARY PULMONARY NODULE: ICD-10-CM

## 2024-11-25 DIAGNOSIS — I10 ESSENTIAL HYPERTENSION: ICD-10-CM

## 2024-11-25 DIAGNOSIS — Z00.00 ROUTINE PHYSICAL EXAMINATION: Primary | ICD-10-CM

## 2024-11-25 DIAGNOSIS — R91.8 LUNG NODULES: Primary | ICD-10-CM

## 2024-11-25 PROCEDURE — 99396 PREV VISIT EST AGE 40-64: CPT | Mod: HCNC,S$GLB,, | Performed by: INTERNAL MEDICINE

## 2024-11-25 PROCEDURE — 3044F HG A1C LEVEL LT 7.0%: CPT | Mod: HCNC,CPTII,S$GLB, | Performed by: INTERNAL MEDICINE

## 2024-11-25 PROCEDURE — 1160F RVW MEDS BY RX/DR IN RCRD: CPT | Mod: HCNC,CPTII,S$GLB, | Performed by: INTERNAL MEDICINE

## 2024-11-25 PROCEDURE — 1159F MED LIST DOCD IN RCRD: CPT | Mod: HCNC,CPTII,S$GLB, | Performed by: INTERNAL MEDICINE

## 2024-11-25 PROCEDURE — 3008F BODY MASS INDEX DOCD: CPT | Mod: HCNC,CPTII,S$GLB, | Performed by: INTERNAL MEDICINE

## 2024-11-25 PROCEDURE — 99999 PR PBB SHADOW E&M-EST. PATIENT-LVL III: CPT | Mod: PBBFAC,HCNC,, | Performed by: INTERNAL MEDICINE

## 2024-11-25 PROCEDURE — 3075F SYST BP GE 130 - 139MM HG: CPT | Mod: HCNC,CPTII,S$GLB, | Performed by: INTERNAL MEDICINE

## 2024-11-25 PROCEDURE — 3079F DIAST BP 80-89 MM HG: CPT | Mod: HCNC,CPTII,S$GLB, | Performed by: INTERNAL MEDICINE

## 2024-11-25 NOTE — PROGRESS NOTES
Subjective:       Patient ID: Wild Galeano is a 59 y.o. male.  Chief Complaint: Follow-up (6mth follow up visit) and Headache (Pt states he has a headache /Started yesterday and worse today)     HPI    Here for routine health maintenance.    [facility].       Refuses vaccines         HTN - controlled    PreDM - mild    HLD with high triglycerides - eats banana split every day.  Discussed diet changes.      5/3 had cervical surgery - cervical fusion and kate T2 - C2.      Lung CT = new 4 mm nodule + others.  Needs repeat in 6 mo.  Scheduled for May  Smoked > 1 PPD for 20 yr.  (44 yr 1-2 ppd).  DC 10/2022.    2x polypectomies.  Repeat 2028    Assessment:       1. Routine physical examination    2. Essential hypertension    3. Lung nodule        Plan:       Routine physical examination    Essential hypertension    Lung nodule          Wellness reviewed       Visit today included increased complexity associated with the care of the episodic problem HTN addressed and managing the longitudinal care of the patient due to the serious and/or complex managed problem(s) HTN.  Continue current management and monitor.  Other diagnoses were reviewed and found stable and will continue to monitor.  Counseled on regular exercise, maintenance of a healthy weight, balanced diet rich in fruits/vegetables and lean protein, and avoidance of unhealthy habits like smoking and excessive alcohol intake.   Also, counseled on importance of being compliant with medication, health appointments, diet and exercise.     Follow up in about 1 year (around 11/25/2025).      Medication List with Changes/Refills   Current Medications    CYCLOBENZAPRINE (FLEXERIL) 5 MG TABLET    Take 1 tablet by mouth 3 times daily    GABAPENTIN (NEURONTIN) 300 MG CAPSULE    Take 1 capsule by oral route 3 times every day    OXYCODONE-ACETAMINOPHEN (PERCOCET) 7.5-325 MG PER TABLET    Take 1 tablet by mouth every 8 (eight) hours as needed for severe pain (7  to 10 on pain scale)    OXYCODONE-ACETAMINOPHEN (PERCOCET) 7.5-325 MG PER TABLET    Take 1 tablet by oral route  every 8 hours as needed as needed for Pain, Severe 7-10 On Pain Scale    OXYCODONE-ACETAMINOPHEN (PERCOCET) 7.5-325 MG PER TABLET    Take 1 tablet by mouth every 8 (eight) hours as needed for severe pain ( 7 - 10 on pain scale).       BP Readings from Last 3 Encounters:   11/25/24 136/88   11/01/24 130/76   01/24/24 122/86     Hemoglobin A1C   Date Value Ref Range Status   11/22/2024 5.7 (H) 4.0 - 5.6 % Final     Comment:     ADA Screening Guidelines:  5.7-6.4%  Consistent with prediabetes  >or=6.5%  Consistent with diabetes    High levels of fetal hemoglobin interfere with the HbA1C  assay. Heterozygous hemoglobin variants (HbS, HgC, etc)do  not significantly interfere with this assay.   However, presence of multiple variants may affect accuracy.     04/27/2023 5.7 (H) 0.0 - 5.6 % Final     Comment:     Reference Interval:  5.0 - 5.6 Normal   5.7 - 6.4 High Risk   > 6.5 Diabetic      Hgb A1c results are standardized based on the (NGSP) National   Glycohemoglobin Standardization Program.      Hemoglobin A1C levels are related to mean serum/plasma glucose   during the preceding 2-3 months.        05/10/2017 5.9 4.5 - 6.2 % Final     Comment:     According to ADA guidelines, hemoglobin A1C <7.0% represents  optimal control in non-pregnant diabetic patients.  Different  metrics may apply to specific populations.   Standards of Medical Care in Diabetes - 2016.  For the purpose of screening for the presence of diabetes:  <5.7%     Consistent with the absence of diabetes  5.7-6.4%  Consistent with increasing risk for diabetes   (prediabetes)  >or=6.5%  Consistent with diabetes  Currently no consensus exists for use of hemoglobin A1C  for diagnosis of diabetes for children.       Lab Results   Component Value Date    TSH 1.437 07/02/2019     Lab Results   Component Value Date    LDLCALC 143.6 11/22/2024     LDLCALC 128.4 07/02/2019     Lab Results   Component Value Date    TRIG 202 (H) 11/22/2024    TRIG 113 07/02/2019     Wt Readings from Last 3 Encounters:   11/25/24 77.4 kg (170 lb 10.2 oz)   11/01/24 77.8 kg (171 lb 8.3 oz)   03/20/24 75.3 kg (166 lb 0.1 oz)     Lab Results   Component Value Date    HGB 14.5 01/24/2024    HCT 44.3 01/24/2024    WBC 6.16 01/24/2024    ALT 20 11/22/2024    AST 29 11/22/2024     11/22/2024    K 3.7 11/22/2024    CREATININE 0.7 11/22/2024    PSA 0.57 07/02/2019           Review of Systems   Constitutional:  Negative for diaphoresis and fever.   HENT:  Negative for drooling and nosebleeds.    Eyes:  Negative for discharge and redness.   Respiratory:  Negative for apnea and choking.    Cardiovascular:  Negative for chest pain and palpitations.   Gastrointestinal:  Negative for abdominal pain and nausea.   Skin:  Negative for color change.   Neurological:  Negative for seizures and syncope.   Psychiatric/Behavioral:  Negative for behavioral problems.            Objective:      Vitals:    11/25/24 1407   BP: 136/88   Pulse:      Physical Exam  Vitals reviewed.   Eyes:      Conjunctiva/sclera: Conjunctivae normal.   Neck:      Thyroid: No thyromegaly.      Trachea: Trachea normal.   Cardiovascular:      Rate and Rhythm: Normal rate and regular rhythm.      Comments: Edema negative  Pulmonary:      Effort: Pulmonary effort is normal.      Breath sounds: Normal breath sounds.   Abdominal:      General: Bowel sounds are normal.      Palpations: Abdomen is soft. There is no hepatomegaly.   Musculoskeletal:      Cervical back: Normal range of motion.      Comments: ROM normal bilateral  Strength normal bilateral   Skin:     General: Skin is warm and dry.   Neurological:      Deep Tendon Reflexes: Reflexes are normal and symmetric.   Psychiatric:      Comments: Alert and Oriented

## 2024-11-25 NOTE — TELEPHONE ENCOUNTER
CT shows new lung nodules. They appear benign but require close follow up with repeat Ct in 6 months. Please notify pt and schedule follow up scan

## 2025-04-28 DIAGNOSIS — Z00.00 ENCOUNTER FOR MEDICARE ANNUAL WELLNESS EXAM: ICD-10-CM

## 2025-05-06 ENCOUNTER — RESULTS FOLLOW-UP (OUTPATIENT)
Dept: FAMILY MEDICINE | Facility: CLINIC | Age: 60
End: 2025-05-06

## 2025-05-06 ENCOUNTER — HOSPITAL ENCOUNTER (OUTPATIENT)
Dept: RADIOLOGY | Facility: HOSPITAL | Age: 60
Discharge: HOME | End: 2025-05-06
Attending: NURSE PRACTITIONER
Payer: MEDICARE

## 2025-05-06 DIAGNOSIS — R91.1 SOLITARY PULMONARY NODULE: ICD-10-CM

## 2025-05-06 DIAGNOSIS — R91.8 LUNG NODULES: ICD-10-CM

## 2025-05-06 PROCEDURE — 71250 CT THORAX DX C-: CPT | Mod: 26,HCNC,, | Performed by: RADIOLOGY

## 2025-05-06 PROCEDURE — 71250 CT THORAX DX C-: CPT | Mod: TC,HCNC,PO

## 2025-05-21 NOTE — TELEPHONE ENCOUNTER
No care due was identified.  [facility] Embedded Care Due Messages. Reference number: [id number].   5/21/2025 4:21:13 PM CDT

## 2025-05-22 RX ORDER — CYCLOBENZAPRINE HCL 5 MG
TABLET ORAL
Qty: 90 TABLET | Refills: 1 | Status: SHIPPED | OUTPATIENT
Start: 2025-05-22

## (undated) DEVICE — APPLICATOR CHLORAPREP CLR 10.5

## (undated) DEVICE — TRAY NERVE BLOCK

## (undated) DEVICE — NDL SPINAL SPINOCAN 22GX3.5

## (undated) DEVICE — GLOVE SURGICAL LATEX SZ 7

## (undated) DEVICE — MARKER SKIN STND TIP BLUE BARR

## (undated) DEVICE — TOWEL OR DISP STRL BLUE 4/PK